# Patient Record
Sex: FEMALE | Race: BLACK OR AFRICAN AMERICAN | Employment: PART TIME | URBAN - METROPOLITAN AREA
[De-identification: names, ages, dates, MRNs, and addresses within clinical notes are randomized per-mention and may not be internally consistent; named-entity substitution may affect disease eponyms.]

---

## 2017-06-15 LAB
CHLAMYDIA, EXTERNAL: NEGATIVE
HBSAG, EXTERNAL: NEGATIVE
HIV, EXTERNAL: NON REACTIVE
N. GONORRHEA, EXTERNAL: NEGATIVE
RPR, EXTERNAL: NON REACTIVE
RUBELLA, EXTERNAL: NORMAL
TYPE, ABO & RH, EXTERNAL: NORMAL

## 2017-09-06 ENCOUNTER — HOSPITAL ENCOUNTER (OUTPATIENT)
Age: 27
Setting detail: OBSERVATION
Discharge: OTHER INSTITUTION | End: 2017-09-06
Attending: OBSTETRICS & GYNECOLOGY | Admitting: OBSTETRICS & GYNECOLOGY
Payer: MEDICAID

## 2017-09-06 VITALS
HEART RATE: 81 BPM | WEIGHT: 207 LBS | SYSTOLIC BLOOD PRESSURE: 120 MMHG | OXYGEN SATURATION: 100 % | HEIGHT: 66 IN | RESPIRATION RATE: 20 BRPM | DIASTOLIC BLOOD PRESSURE: 66 MMHG | BODY MASS INDEX: 33.27 KG/M2 | TEMPERATURE: 98.3 F

## 2017-09-06 PROBLEM — Z33.1 IUP (INTRAUTERINE PREGNANCY), INCIDENTAL: Status: ACTIVE | Noted: 2017-09-06

## 2017-09-06 LAB
ABO + RH BLD: NORMAL
AMPHET UR QL SCN: NEGATIVE
BARBITURATES UR QL SCN: NEGATIVE
BASOPHILS # BLD: 0 K/UL (ref 0–0.06)
BASOPHILS NFR BLD: 0 % (ref 0–2)
BENZODIAZ UR QL: NEGATIVE
BLOOD GROUP ANTIBODIES SERPL: NORMAL
CANNABINOIDS UR QL SCN: POSITIVE
COCAINE UR QL SCN: NEGATIVE
DIFFERENTIAL METHOD BLD: ABNORMAL
EOSINOPHIL # BLD: 0 K/UL (ref 0–0.4)
EOSINOPHIL NFR BLD: 0 % (ref 0–5)
ERYTHROCYTE [DISTWIDTH] IN BLOOD BY AUTOMATED COUNT: 13.9 % (ref 11.6–14.5)
HCT VFR BLD AUTO: 32.9 % (ref 35–45)
HDSCOM,HDSCOM: ABNORMAL
HGB BLD-MCNC: 11.4 G/DL (ref 12–16)
LYMPHOCYTES # BLD: 2.2 K/UL (ref 0.9–3.6)
LYMPHOCYTES NFR BLD: 25 % (ref 21–52)
MCH RBC QN AUTO: 29.7 PG (ref 24–34)
MCHC RBC AUTO-ENTMCNC: 34.7 G/DL (ref 31–37)
MCV RBC AUTO: 85.7 FL (ref 74–97)
METHADONE UR QL: NEGATIVE
MONOCYTES # BLD: 0.7 K/UL (ref 0.05–1.2)
MONOCYTES NFR BLD: 7 % (ref 3–10)
NEUTS SEG # BLD: 6 K/UL (ref 1.8–8)
NEUTS SEG NFR BLD: 68 % (ref 40–73)
OPIATES UR QL: NEGATIVE
PCP UR QL: NEGATIVE
PLATELET # BLD AUTO: 240 K/UL (ref 135–420)
PMV BLD AUTO: 10.9 FL (ref 9.2–11.8)
RBC # BLD AUTO: 3.84 M/UL (ref 4.2–5.3)
SPECIMEN EXP DATE BLD: NORMAL
WBC # BLD AUTO: 8.9 K/UL (ref 4.6–13.2)

## 2017-09-06 PROCEDURE — 86900 BLOOD TYPING SEROLOGIC ABO: CPT

## 2017-09-06 PROCEDURE — 99285 EMERGENCY DEPT VISIT HI MDM: CPT

## 2017-09-06 PROCEDURE — 96360 HYDRATION IV INFUSION INIT: CPT

## 2017-09-06 PROCEDURE — 96372 THER/PROPH/DIAG INJ SC/IM: CPT

## 2017-09-06 PROCEDURE — 99218 HC RM OBSERVATION: CPT

## 2017-09-06 PROCEDURE — 80307 DRUG TEST PRSMV CHEM ANLYZR: CPT

## 2017-09-06 PROCEDURE — 85025 COMPLETE CBC W/AUTO DIFF WBC: CPT

## 2017-09-06 PROCEDURE — 36415 COLL VENOUS BLD VENIPUNCTURE: CPT

## 2017-09-06 PROCEDURE — 96365 THER/PROPH/DIAG IV INF INIT: CPT

## 2017-09-06 PROCEDURE — 74011250636 HC RX REV CODE- 250/636: Performed by: ADVANCED PRACTICE MIDWIFE

## 2017-09-06 PROCEDURE — 87086 URINE CULTURE/COLONY COUNT: CPT

## 2017-09-06 RX ORDER — SODIUM CHLORIDE 0.9 % (FLUSH) 0.9 %
5-10 SYRINGE (ML) INJECTION EVERY 8 HOURS
Status: DISCONTINUED | OUTPATIENT
Start: 2017-09-06 | End: 2017-09-06 | Stop reason: HOSPADM

## 2017-09-06 RX ORDER — BETAMETHASONE SODIUM PHOSPHATE AND BETAMETHASONE ACETATE 3; 3 MG/ML; MG/ML
12 INJECTION, SUSPENSION INTRA-ARTICULAR; INTRALESIONAL; INTRAMUSCULAR; SOFT TISSUE ONCE
Status: COMPLETED | OUTPATIENT
Start: 2017-09-06 | End: 2017-09-06

## 2017-09-06 RX ORDER — MAGNESIUM SULFATE HEPTAHYDRATE 40 MG/ML
2 INJECTION, SOLUTION INTRAVENOUS
Status: COMPLETED | OUTPATIENT
Start: 2017-09-06 | End: 2017-09-06

## 2017-09-06 RX ORDER — SODIUM CHLORIDE, SODIUM LACTATE, POTASSIUM CHLORIDE, CALCIUM CHLORIDE 600; 310; 30; 20 MG/100ML; MG/100ML; MG/100ML; MG/100ML
75 INJECTION, SOLUTION INTRAVENOUS CONTINUOUS
Status: DISCONTINUED | OUTPATIENT
Start: 2017-09-06 | End: 2017-09-06 | Stop reason: HOSPADM

## 2017-09-06 RX ADMIN — BETAMETHASONE SODIUM PHOSPHATE AND BETAMETHASONE ACETATE 12 MG: 3; 3 INJECTION, SUSPENSION INTRA-ARTICULAR; INTRALESIONAL; INTRAMUSCULAR at 09:13

## 2017-09-06 RX ADMIN — Medication 2 G/HR: at 09:40

## 2017-09-06 RX ADMIN — MAGNESIUM SULFATE HEPTAHYDRATE 2 G: 40 INJECTION, SOLUTION INTRAVENOUS at 09:15

## 2017-09-06 RX ADMIN — MAGNESIUM SULFATE HEPTAHYDRATE 2 G: 40 INJECTION, SOLUTION INTRAVENOUS at 09:23

## 2017-09-06 RX ADMIN — SODIUM CHLORIDE, SODIUM LACTATE, POTASSIUM CHLORIDE, AND CALCIUM CHLORIDE 75 ML/HR: 600; 310; 30; 20 INJECTION, SOLUTION INTRAVENOUS at 09:20

## 2017-09-06 NOTE — IP AVS SNAPSHOT
Summary of Care Report The Summary of Care report has been created to help improve care coordination. Users with access to [employer] or [address] (Web-based application) may access additional patient information including the Discharge Summary. If you are not currently a [address] user and need more information, please call the number listed below in the [place] [address] section and ask to be connected with Medical Records. Facility Information Name Address Phone [address] [address] [phone] Patient Information Patient Name Sex  Sadiq Boyd ([id number]) Female 1990 Discharge Information Admitting Provider Service Area Unit Lor Harrison MD / [phone] [employer] [address] [place] [address] / [phone] Discharge Provider Discharge Date/Time Discharge Disposition Destination (none) (none) (none) (none) Patient Language Language ENGLISH [13] Hospital Problems as of 2017  Never Reviewed Class Noted - Resolved Last Modified POA Active Problems IUP (intrauterine pregnancy), incidental  2017 - Present 2017 by Lor Harrison MD Unknown Entered by Lor Harrison MD  
  
You are allergic to the following No active allergies Current Discharge Medication List  
  
Notice You have not been prescribed any medications. Follow-up Information None Discharge Instructions Learning About When to Call Your Doctor During Pregnancy (After 20 Weeks) Your Care Instructions It's common to have concerns about what might be a problem during pregnancy. Although most pregnant women don't have any serious problems, it's important to know when to call your doctor if you have certain symptoms or signs of labor. These are general suggestions. Your doctor may give you some more information about when to call. When to call your doctor (after 20 weeks) Call 911 anytime you think you may need emergency care. For example, call if: 
· You have severe vaginal bleeding. · You have sudden, severe pain in your belly. · You passed out (lost consciousness). · You have a seizure. · You see or feel the umbilical cord. · You think you are about to deliver your baby and can't make it safely to the hospital. 
Call your doctor now or seek immediate medical care if: 
· You have vaginal bleeding. · You have belly pain. · You have a fever. · You have symptoms of preeclampsia, such as: 
¨ Sudden swelling of your face, hands, or feet. ¨ New vision problems (such as dimness or blurring). ¨ A severe headache. · You have a sudden release of fluid from your vagina. (You think your water broke.) · You think that you may be in labor. This means that you've had at least 4 contractions within 20 minutes or at least 8 contractions in an hour. · You notice that your baby has stopped moving or is moving much less than normal. 
· You have symptoms of a urinary tract infection. These may include: 
¨ Pain or burning when you urinate. ¨ A frequent need to urinate without being able to pass much urine. ¨ Pain in the flank, which is just below the rib cage and above the waist on either side of the back. ¨ Blood in your urine. Watch closely for changes in your health, and be sure to contact your doctor if: 
· You have vaginal discharge that smells bad. · You have skin changes, such as: ¨ A rash. ¨ Itching. ¨ Yellow color to your skin. · You have other concerns about your pregnancy. If you have labor signs at 37 weeks or more If you have signs of labor at 37 weeks or more, your doctor may tell you to call when your labor becomes more active. Symptoms of active labor include: 
· Contractions that are regular. · Contractions that are less than 5 minutes apart. · Contractions that are hard to talk through. Follow-up care is a key part of your treatment and safety. Be sure to make and go to all appointments, and call your doctor if you are having problems. It's also a good idea to know your test results and keep a list of the medicines you take. Where can you learn more? Go to http://alyse-mayi.info/. Enter  in the search box to learn more about \"Learning About When to Call Your Doctor During Pregnancy (After 20 Weeks). \" 
Current as of: March 16, 2017 Content Version: 11.3 © 2259-6274 [email or web address]. Care instructions adapted under license by [employer] (which disclaims liability or warranty for this information). If you have questions about a medical condition or this instruction, always ask your healthcare professional. [place] [address] any warranty or liability for your use of this information. Chart Review Routing History No Routing History on File

## 2017-09-06 NOTE — PROGRESS NOTES
Pt transferred from [facility] via ambulance for bleeding. Pt states it was enough to saturate 2 towels. Nahum Elizabeth at bedside doing sterile speculum. Pt is 1cm via sterile spec. Plan of care is to give Celestone, and Magnesium drip and then transfer to [facility] Dr Vivi Hines at bedside. Plan of care discussed. 1591 SBAR report to [facility]. Transport to arrive shortly. [id number] Transport here to  patient.

## 2017-09-06 NOTE — DISCHARGE INSTRUCTIONS
Learning About When to Call Your Doctor During Pregnancy (After 20 Weeks)  Your Care Instructions  It's common to have concerns about what might be a problem during pregnancy. Although most pregnant women don't have any serious problems, it's important to know when to call your doctor if you have certain symptoms or signs of labor. These are general suggestions. Your doctor may give you some more information about when to call. When to call your doctor (after 20 weeks)  Call 911 anytime you think you may need emergency care. For example, call if:  · You have severe vaginal bleeding. · You have sudden, severe pain in your belly. · You passed out (lost consciousness). · You have a seizure. · You see or feel the umbilical cord. · You think you are about to deliver your baby and can't make it safely to the hospital.  Call your doctor now or seek immediate medical care if:  · You have vaginal bleeding. · You have belly pain. · You have a fever. · You have symptoms of preeclampsia, such as:  ¨ Sudden swelling of your face, hands, or feet. ¨ New vision problems (such as dimness or blurring). ¨ A severe headache. · You have a sudden release of fluid from your vagina. (You think your water broke.)  · You think that you may be in labor. This means that you've had at least 4 contractions within 20 minutes or at least 8 contractions in an hour. · You notice that your baby has stopped moving or is moving much less than normal.  · You have symptoms of a urinary tract infection. These may include:  ¨ Pain or burning when you urinate. ¨ A frequent need to urinate without being able to pass much urine. ¨ Pain in the flank, which is just below the rib cage and above the waist on either side of the back. ¨ Blood in your urine. Watch closely for changes in your health, and be sure to contact your doctor if:  · You have vaginal discharge that smells bad.   · You have skin changes, such as:  ¨ A rash.  ¨ Itching. ¨ Yellow color to your skin. · You have other concerns about your pregnancy. If you have labor signs at 37 weeks or more  If you have signs of labor at 37 weeks or more, your doctor may tell you to call when your labor becomes more active. Symptoms of active labor include:  · Contractions that are regular. · Contractions that are less than 5 minutes apart. · Contractions that are hard to talk through. Follow-up care is a key part of your treatment and safety. Be sure to make and go to all appointments, and call your doctor if you are having problems. It's also a good idea to know your test results and keep a list of the medicines you take. Where can you learn more? Go to http://alyse-mayi.info/. Enter  in the search box to learn more about \"Learning About When to Call Your Doctor During Pregnancy (After 20 Weeks). \"  Current as of: March 16, 2017  Content Version: 11.3  © 7989-7967 [employer]. Care instructions adapted under license by [employer] (which disclaims liability or warranty for this information). If you have questions about a medical condition or this instruction, always ask your healthcare professional. [place] [address] any warranty or liability for your use of this information.

## 2017-09-06 NOTE — IP AVS SNAPSHOT
Astrid Israel 
 
 
 [address] [facility] [address] 
[phone] Patient: Monica July MRN: [id number] :1990 You are allergic to the following No active allergies Recent Documentation Height Weight BMI OB Status Smoking Status 1.676 m 93.9 kg 33.41 kg/m2 Pregnant Current Some Day Smoker Unresulted Labs Order Current Status CULTURE, URINE In process DRUG SCREEN, URINE In process TYPE & SCREEN Preliminary result Emergency Contacts Name Discharge Info Relation Home Work Mobile Haylie Palma DISCHARGE CAREGIVER [3] Other Relative [6] [phone] About your hospitalization You were admitted on:  N/A You last received care in the:  [place] [address] [facility] You were discharged on:  2017 Unit phone number:  [phone] Why you were hospitalized Your primary diagnosis was:  Not on File Your diagnoses also included:  Iup (Intrauterine Pregnancy), Incidental  
  
  
 
  
  
Providers Seen During Your Hospitalizations Provider Role Specialty Primary office phone Marshall Daniel MD Attending Provider Obstetrics & Gynecology [phone] Your Primary Care Physician (PCP) Primary Care Physician Office Phone Office Fax NONE ** None ** ** None ** Follow-up Information None Current Discharge Medication List  
  
Notice You have not been prescribed any medications. Discharge Instructions Learning About When to Call Your Doctor During Pregnancy (After 20 Weeks) Your Care Instructions It's common to have concerns about what might be a problem during pregnancy. Although most pregnant women don't have any serious problems, it's important to know when to call your doctor if you have certain symptoms or signs of labor. These are general suggestions.  Your doctor may give you some more information about when to call. When to call your doctor (after 20 weeks) Call 911 anytime you think you may need emergency care. For example, call if: 
· You have severe vaginal bleeding. · You have sudden, severe pain in your belly. · You passed out (lost consciousness). · You have a seizure. · You see or feel the umbilical cord. · You think you are about to deliver your baby and can't make it safely to the hospital. 
Call your doctor now or seek immediate medical care if: 
· You have vaginal bleeding. · You have belly pain. · You have a fever. · You have symptoms of preeclampsia, such as: 
¨ Sudden swelling of your face, hands, or feet. ¨ New vision problems (such as dimness or blurring). ¨ A severe headache. · You have a sudden release of fluid from your vagina. (You think your water broke.) · You think that you may be in labor. This means that you've had at least 4 contractions within 20 minutes or at least 8 contractions in an hour. · You notice that your baby has stopped moving or is moving much less than normal. 
· You have symptoms of a urinary tract infection. These may include: 
¨ Pain or burning when you urinate. ¨ A frequent need to urinate without being able to pass much urine. ¨ Pain in the flank, which is just below the rib cage and above the waist on either side of the back. ¨ Blood in your urine. Watch closely for changes in your health, and be sure to contact your doctor if: 
· You have vaginal discharge that smells bad. · You have skin changes, such as: ¨ A rash. ¨ Itching. ¨ Yellow color to your skin. · You have other concerns about your pregnancy. If you have labor signs at 37 weeks or more If you have signs of labor at 37 weeks or more, your doctor may tell you to call when your labor becomes more active. Symptoms of active labor include: 
· Contractions that are regular. · Contractions that are less than 5 minutes apart. · Contractions that are hard to talk through. Follow-up care is a key part of your treatment and safety. Be sure to make and go to all appointments, and call your doctor if you are having problems. It's also a good idea to know your test results and keep a list of the medicines you take. Where can you learn more? Go to http://alyse-mayi.info/. Enter  in the search box to learn more about \"Learning About When to Call Your Doctor During Pregnancy (After 20 Weeks). \" 
Current as of: March 16, 2017 Content Version: 11.3 © 5848-2249 [employer]. Care instructions adapted under license by [employer] (which disclaims liability or warranty for this information). If you have questions about a medical condition or this instruction, always ask your healthcare professional. [address] any warranty or liability for your use of this information. Discharge Orders None Introducing [facility]! Chan Ramos introduces [employer] patient portal. Now you can access parts of your medical record, email your doctor's office, and request medication refills online. 1. In your internet browser, go to [email or web address] 2. Click on the First Time User? Click Here link in the Sign In box. You will see the New Member Sign Up page. 3. Enter your [employer] Access Code exactly as it appears below. You will not need to use this code after youve completed the sign-up process. If you do not sign up before the expiration date, you must request a new code. · [employer] Access Code: [email or web address] Expires: 12/5/2017 10:09 AM 
 
4. Enter the last four digits of your Social Security Number (xxxx) and Date of Birth (mm/dd/yyyy) as indicated and click Submit. You will be taken to the next sign-up page. 5. Create a [employer] ID. This will be your [employer] login ID and cannot be changed, so think of one that is secure and easy to remember. 6. Create a TowerJ[employer] password. You can change your password at any time. 7. Enter your Password Reset Question and Answer. This can be used at a later time if you forget your password. 8. Enter your e-mail address. You will receive e-mail notification when new information is available in [address] 9. Click Sign Up. You can now view and download portions of your medical record. 10. Click the Download Summary menu link to download a portable copy of your medical information. If you have questions, please visit the Frequently Asked Questions section of the [employer] website. Remember, [employer]azz is NOT to be used for urgent needs. For medical emergencies, dial 911. Now available from your iPhone and Android! General Information Please provide this summary of care documentation to your next provider. Patient Signature:  ____________________________________________________________ Date:  ____________________________________________________________  
  
S[place] Provider Signature:  ____________________________________________________________ Date:  ____________________________________________________________

## 2017-09-06 NOTE — IP AVS SNAPSHOT
[address] [place] [id number] 
[phone] Patient: Ann-Marie Perez MRN: [id number] :1990 Current Discharge Medication List  
  
Notice You have not been prescribed any medications.

## 2017-09-06 NOTE — H&P
History & Physical    Name: Jessenia Hidalgo MRN: [id number]  SSN: [id number]    YOB: 1990  Age: 33 y.o. Sex: female        Subjective:     Estimated Date of Delivery: 12/15/17  OB History      Para Term  AB Living    1         SAB TAB Ectopic Molar Multiple Live Births                   Ms. Kaitlyn Silvestre   is admitted with pregnancy at 25 weeks 5 days for vaginal bleeding. She was transferred from [facility] via ambulance after two episodes of bleeding at home. Upon arrival patient passed a softball size clot in the bathroom  Prenatal course was normal. Please see prenatal records for details. Patient has a fundal fibroid 3.4x3. 4x3.8. PMHx, SHx, Family Hx, ROS unchanged from prenatal H&P. Group B Strep was not tested. Objective:     Vitals:  Vitals:    17 0833 17 0839   BP: 126/70    Pulse: 87    Resp: 20    Temp: 98.2 °F (36.8 °C)    Weight:  93.9 kg (207 lb)   Height:  5' 6\" (1.676 m)        Physical Exam:     Blood pressure 126/70, pulse 87, temperature 98.2 °F (36.8 °C), resp. rate 20, height 5' 6\" (1.676 m), weight 93.9 kg (207 lb), last menstrual period 10/11/2016. Temp (24hrs), Av.2 °F (36.8 °C), Min:98.2 °F (36.8 °C), Max:98.2 °F (36.8 °C)     Sterile speculum exam: cervix 1 cm dilated; blood coming from the cervical os         Data Review:   No results found for this or any previous visit (from the past 24 hour(s)). Monitor:  Reactivity: reactive for gestational age Variability:present Baseline:within normal limits        Assessment/Plan:     Plan:  Admit for vaginal bleeding, Risks/Benefits explained and Consent Signed. Magnesium sulfate 4GM load with 2GM maintenance for neuroprotection and start  steriods, betamethasone 12mg x2 doses q 24 hours. Urine culture and urine drug screen.         Signed By:  Isatu Wasserman CNM     2017

## 2017-09-06 NOTE — PROGRESS NOTES
Labor Progress Note  Patient seen, fetal heart rate and bleeding evaluated. Physical Exam:  Bleeding stable, no pain, uterine tenderness or ctxs     Fetal Heart Rate: Reactive for EGA    Assessment:  Stable enough for transfer    PLAN:  Dylon'd w/ Dr. Maritza ADRIAN who accepts transfer. Has had one dose of steroids, Mag bolus.      Mitzi Casas MD  9/6/2017  9:42 AM

## 2017-09-08 LAB
BACTERIA SPEC CULT: NORMAL
SERVICE CMNT-IMP: NORMAL

## 2017-09-17 ENCOUNTER — HOSPITAL ENCOUNTER (OUTPATIENT)
Age: 27
Setting detail: OBSERVATION
Discharge: HOME | End: 2017-09-18
Attending: OBSTETRICS & GYNECOLOGY | Admitting: OBSTETRICS & GYNECOLOGY
Payer: MEDICAID

## 2017-09-17 LAB
ABO + RH BLD: NORMAL
BASOPHILS # BLD: 0 K/UL (ref 0–0.06)
BASOPHILS NFR BLD: 0 % (ref 0–2)
BLOOD GROUP ANTIBODIES SERPL: NORMAL
DIFFERENTIAL METHOD BLD: ABNORMAL
EOSINOPHIL # BLD: 0.1 K/UL (ref 0–0.4)
EOSINOPHIL NFR BLD: 1 % (ref 0–5)
ERYTHROCYTE [DISTWIDTH] IN BLOOD BY AUTOMATED COUNT: 13.6 % (ref 11.6–14.5)
HCT VFR BLD AUTO: 32.5 % (ref 35–45)
HGB BLD-MCNC: 11.1 G/DL (ref 12–16)
LYMPHOCYTES # BLD: 2.5 K/UL (ref 0.9–3.6)
LYMPHOCYTES NFR BLD: 27 % (ref 21–52)
MCH RBC QN AUTO: 29.1 PG (ref 24–34)
MCHC RBC AUTO-ENTMCNC: 34.2 G/DL (ref 31–37)
MCV RBC AUTO: 85.1 FL (ref 74–97)
MONOCYTES # BLD: 0.7 K/UL (ref 0.05–1.2)
MONOCYTES NFR BLD: 8 % (ref 3–10)
NEUTS SEG # BLD: 5.9 K/UL (ref 1.8–8)
NEUTS SEG NFR BLD: 64 % (ref 40–73)
PLATELET # BLD AUTO: 286 K/UL (ref 135–420)
PMV BLD AUTO: 10.3 FL (ref 9.2–11.8)
RBC # BLD AUTO: 3.82 M/UL (ref 4.2–5.3)
SPECIMEN EXP DATE BLD: NORMAL
WBC # BLD AUTO: 9.1 K/UL (ref 4.6–13.2)

## 2017-09-17 PROCEDURE — 96361 HYDRATE IV INFUSION ADD-ON: CPT

## 2017-09-17 PROCEDURE — 36415 COLL VENOUS BLD VENIPUNCTURE: CPT | Performed by: OBSTETRICS & GYNECOLOGY

## 2017-09-17 PROCEDURE — 99218 HC RM OBSERVATION: CPT

## 2017-09-17 PROCEDURE — 74011250637 HC RX REV CODE- 250/637: Performed by: OBSTETRICS & GYNECOLOGY

## 2017-09-17 PROCEDURE — 86900 BLOOD TYPING SEROLOGIC ABO: CPT | Performed by: OBSTETRICS & GYNECOLOGY

## 2017-09-17 PROCEDURE — 96360 HYDRATION IV INFUSION INIT: CPT

## 2017-09-17 PROCEDURE — 85025 COMPLETE CBC W/AUTO DIFF WBC: CPT | Performed by: OBSTETRICS & GYNECOLOGY

## 2017-09-17 PROCEDURE — 74011250636 HC RX REV CODE- 250/636: Performed by: OBSTETRICS & GYNECOLOGY

## 2017-09-17 RX ORDER — LANOLIN ALCOHOL/MO/W.PET/CERES
CREAM (GRAM) TOPICAL
Status: ON HOLD | COMMUNITY
End: 2017-11-02

## 2017-09-17 RX ORDER — ACETAMINOPHEN 325 MG/1
650 TABLET ORAL
Status: DISCONTINUED | OUTPATIENT
Start: 2017-09-17 | End: 2017-09-18 | Stop reason: HOSPADM

## 2017-09-17 RX ORDER — SODIUM CHLORIDE, SODIUM LACTATE, POTASSIUM CHLORIDE, CALCIUM CHLORIDE 600; 310; 30; 20 MG/100ML; MG/100ML; MG/100ML; MG/100ML
125 INJECTION, SOLUTION INTRAVENOUS CONTINUOUS
Status: DISCONTINUED | OUTPATIENT
Start: 2017-09-17 | End: 2017-09-18 | Stop reason: HOSPADM

## 2017-09-17 RX ORDER — DOCUSATE SODIUM 100 MG/1
100 CAPSULE, LIQUID FILLED ORAL 2 TIMES DAILY
Status: ON HOLD | COMMUNITY
End: 2017-11-02

## 2017-09-17 RX ORDER — ZOLPIDEM TARTRATE 5 MG/1
5 TABLET ORAL
Status: DISCONTINUED | OUTPATIENT
Start: 2017-09-17 | End: 2017-09-18 | Stop reason: HOSPADM

## 2017-09-17 RX ADMIN — SODIUM CHLORIDE, SODIUM LACTATE, POTASSIUM CHLORIDE, AND CALCIUM CHLORIDE 125 ML/HR: 600; 310; 30; 20 INJECTION, SOLUTION INTRAVENOUS at 22:04

## 2017-09-17 RX ADMIN — ZOLPIDEM TARTRATE 5 MG: 5 TABLET ORAL at 23:35

## 2017-09-17 RX ADMIN — ACETAMINOPHEN 650 MG: 325 TABLET ORAL at 23:35

## 2017-09-17 NOTE — PROGRESS NOTES
1935 Received to L & D unit c/o vaginal bleeding. States she passed some small clots at home. 27 weeks G1. States was seen at [facility] last week and was transferred to [facility] X 5 days. Assisted to Triage #3.     1943 EFM tested and applied. Abdomen palpates soft. 2000 Scant bleeding noted on pad. 2110  States feeling vaginal bleeding. Small amount of bright red blood noted on pad. 2118 Dr. Stephanie cali. 2130 Dr. Leobardo cali with quick return call and informed of arrival with c/o passing small blood clots at home, 27 weeks, G1,  history of vaginal bleeding which began on 9/6/17, was at [facility] X5 days, received magnesium and 2 doses steroids, then was [address] home. Occasional irritability, EFM tracing described. Orders received to keep as observation, IV- LR and labs. 2136  Plan of care reviewed. 2137 Transferred to [facility] #3.     2140 Lab at bedside. 2206 Audible fetal movement. Told to call nurse if she feels any vaginal bleeding. 0200 Resting in bed with eyes closed. 0340 Assisted up to BR scant dark brown blood noted on pad. Scant red blood noted when wiped. States cramping is gone. C/o hip pain 6/10.    0343 EFM adjusted. 0715 Bedside and Verbal shift change report given to LEO Salazar RN (oncoming nurse) by LYNSEY AUGUSTE (offgoing nurse). Report included the following information SBAR, Procedure Summary, Intake/Output and MAR.

## 2017-09-17 NOTE — IP AVS SNAPSHOT
Summary of Care Report The Summary of Care report has been created to help improve care coordination. Users with access to [employer] or [address] (Web-based application) may access additional patient information including the Discharge Summary. If you are not currently a [address] user and need more information, please call the number listed below in the [place] [address] section and ask to be connected with Medical Records. Facility Information Name Address Phone [address] [address] [phone] Patient Information Patient Name Sex  Gonzalo Kruse ([id number]) Female 1990 Discharge Information Admitting Provider Service Area Unit Peter Celis MD / [phone] [address] L&D / [phone] Discharge Provider Discharge Date/Time Discharge Disposition Destination (none) 2017 Midday (Pending) AHR (none) Patient Language Language ENGLISH [13] Hospital Problems as of 2017  Never Reviewed Class Noted - Resolved Last Modified POA Active Problems Vaginal bleeding in pregnancy  2017 - Present 2017 by Peter Celis MD Unknown Entered by Peter Celis MD  
  
Non-Hospital Problems as of 2017  Never Reviewed Class Noted - Resolved Last Modified Active Problems IUP (intrauterine pregnancy), incidental  2017 - Present 2017 by Peter Celis MD  
  Entered by Peter Celis MD  
  
You are allergic to the following No active allergies Current Discharge Medication List  
  
ASK your doctor about these medications Dose & Instructions Dispensing Information Comments COLACE 100 mg capsule Generic drug:  docusate sodium Dose:  100 mg Take 100 mg by mouth two (2) times a day. Refills:  0 ferrous sulfate 325 mg (65 mg iron) tablet Take  by mouth Daily (before breakfast). Refills:  0  
   
 PNV No12-Iron-FA-DSS-OM-3 29 mg iron-1 mg -50 mg Cpkd Take  by mouth. Refills:  0 Follow-up Information Follow up With Details Comments Contact Info None   None (395) Patient stated that they have no PCP Discharge Instructions DISCHARGE SUMMARY from Nurse ThePATIENT INSTRUCTIONS: 
 
After general anesthesia or intravenous sedation, for 24 hours or while taking prescription Narcotics: · Limit your activities · Do not drive and operate hazardous machinery · Do not make important personal or business decisions · Do  not drink alcoholic beverages · If you have not urinated within 8 hours after discharge, please contact your surgeon on call. Report the following to your surgeon: 
· Excessive pain, swelling, redness or odor of or around the surgical area · Temperature over 100.5 · Nausea and vomiting lasting longer than 4 hours or if unable to take medications · Any signs of decreased circulation or nerve impairment to extremity: change in color, persistent  numbness, tingling, coldness or increase pain · Any questions If you experience any of the following symptoms vaginal bleeding, please call your OBGYN, Dr. Valery Garza. These are general instructions for a healthy lifestyle: No smoking/ No tobacco products/ Avoid exposure to second hand smoke Surgeon General's Warning:  Quitting smoking now greatly reduces serious risk to your health. Obesity, smoking, and sedentary lifestyle greatly increases your risk for illness A healthy diet, regular physical exercise & weight monitoring are important for maintaining a healthy lifestyle You may be retaining fluid if you have a history of heart failure or if you experience any of the following symptoms:  Weight gain of 3 pounds or more overnight or 5 pounds in a week, increased swelling in our hands or feet or shortness of breath while lying flat in bed. Please call your doctor as soon as you notice any of these symptoms; do not wait until your next office visit. Recognize signs and symptoms of STROKE: 
 
F-face looks uneven A-arms unable to move or move unevenly S-speech slurred or non-existent T-time-call 911 as soon as signs and symptoms begin-DO NOT go Back to bed or wait to see if you get better-TIME IS BRAIN. Warning Signs of HEART ATTACK Call 911 if you have these symptoms: 
? Chest discomfort. Most heart attacks involve discomfort in the center of the chest that lasts more than a few minutes, or that goes away and comes back. It can feel like uncomfortable pressure, squeezing, fullness, or pain. ? Discomfort in other areas of the upper body. Symptoms can include pain or discomfort in one or both arms, the back, neck, jaw, or stomach. ? Shortness of breath with or without chest discomfort. ? Other signs may include breaking out in a cold sweat, nausea, or lightheadedness. Don't wait more than five minutes to call [address]! Fast action can save your life. Calling 911 is almost always the fastest way to get lifesaving treatment. Emergency Medical Services staff can begin treatment when they arrive  up to an hour sooner than if someone gets to the hospital by car. The discharge information has been reviewed with the patient. The patient verbalized understanding. Discharge medications reviewed with the patient and appropriate educational materials and side effects teaching were provided. Educated patient regarding fetal kick counts, signs and symptoms of active labor, symptoms to report to MD, when to come back to hospital, and instructed on when to report symptoms to MD.   
 
 
Chart Review Routing History No Routing History on File

## 2017-09-17 NOTE — IP AVS SNAPSHOT
[place] [address] [address] 
[phone] Patient: Molly Nunez MRN: [id number] :1990 Current Discharge Medication List  
  
ASK your doctor about these medications Dose & Instructions Dispensing Information Comments Morning Noon Evening Bedtime COLACE 100 mg capsule Generic drug:  docusate sodium Your last dose was: Your next dose is:    
   
   
 Dose:  100 mg Take 100 mg by mouth two (2) times a day. Refills:  0  
     
   
   
   
  
 ferrous sulfate 325 mg (65 mg iron) tablet Your last dose was: Your next dose is: Take  by mouth Daily (before breakfast). Refills:  0  
     
   
   
   
  
 PNV No12-Iron-FA-DSS-OM-3 29 mg iron-1 mg -50 mg Cpkd Your last dose was: Your next dose is: Take  by mouth. Refills:  0

## 2017-09-17 NOTE — IP AVS SNAPSHOT
[address] [address] 
[phone] Patient: Ann-Marie Perez MRN: [id number] :1990 You are allergic to the following No active allergies Recent Documentation Height Weight BMI OB Status Smoking Status 1.676 m 91.6 kg 32.6 kg/m2 Pregnant Current Some Day Smoker Emergency Contacts Name Discharge Info Relation Home Work Mobile Haylie Palma DISCHARGE CAREGIVER [3] Other Relative [6] [phone] About your hospitalization You were admitted on:  2017 You last received care in the:  [address] You were discharged on:  2017 Unit phone number:  [phone] Why you were hospitalized Your primary diagnosis was:  Not on File Your diagnoses also included:  Vaginal Bleeding In Pregnancy Providers Seen During Your Hospitalizations Provider Role Specialty Primary office phone Verenice Del Rio MD Attending Provider Obstetrics & Gynecology [phone] Your Primary Care Physician (PCP) Primary Care Physician Office Phone Office Fax NONE ** None ** ** None ** Follow-up Information Follow up With Details Comments Contact Info None   None (395) Patient stated that they have no PCP Current Discharge Medication List  
  
ASK your doctor about these medications Dose & Instructions Dispensing Information Comments Morning Noon Evening Bedtime COLACE 100 mg capsule Generic drug:  docusate sodium Your last dose was: Your next dose is:    
   
   
 Dose:  100 mg Take 100 mg by mouth two (2) times a day. Refills:  0  
     
   
   
   
  
 ferrous sulfate 325 mg (65 mg iron) tablet Your last dose was: Your next dose is: Take  by mouth Daily (before breakfast). Refills:  0 PNV No12-Iron-FA-DSS-OM-3 29 mg iron-1 mg -50 mg Cpkd Your last dose was: Your next dose is: Take  by mouth. Refills:  0 Discharge Instructions DISCHARGE SUMMARY from Nurse ThePATIENT INSTRUCTIONS: 
 
After general anesthesia or intravenous sedation, for 24 hours or while taking prescription Narcotics: · Limit your activities · Do not drive and operate hazardous machinery · Do not make important personal or business decisions · Do  not drink alcoholic beverages · If you have not urinated within 8 hours after discharge, please contact your surgeon on call. Report the following to your surgeon: 
· Excessive pain, swelling, redness or odor of or around the surgical area · Temperature over 100.5 · Nausea and vomiting lasting longer than 4 hours or if unable to take medications · Any signs of decreased circulation or nerve impairment to extremity: change in color, persistent  numbness, tingling, coldness or increase pain · Any questions If you experience any of the following symptoms vaginal bleeding, please call your OBGYN, Dr. Aidan Yap. These are general instructions for a healthy lifestyle: No smoking/ No tobacco products/ Avoid exposure to second hand smoke Surgeon General's Warning:  Quitting smoking now greatly reduces serious risk to your health. Obesity, smoking, and sedentary lifestyle greatly increases your risk for illness A healthy diet, regular physical exercise & weight monitoring are important for maintaining a healthy lifestyle You may be retaining fluid if you have a history of heart failure or if you experience any of the following symptoms:  Weight gain of 3 pounds or more overnight or 5 pounds in a week, increased swelling in our hands or feet or shortness of breath while lying flat in bed.   Please call your doctor as soon as you notice any of these symptoms; do not wait until your next office visit. Recognize signs and symptoms of STROKE: 
 
F-face looks uneven A-arms unable to move or move unevenly S-speech slurred or non-existent T-time-call 911 as soon as signs and symptoms begin-DO NOT go Back to bed or wait to see if you get better-TIME IS BRAIN. Warning Signs of HEART ATTACK Call 911 if you have these symptoms: 
? Chest discomfort. Most heart attacks involve discomfort in the center of the chest that lasts more than a few minutes, or that goes away and comes back. It can feel like uncomfortable pressure, squeezing, fullness, or pain. ? Discomfort in other areas of the upper body. Symptoms can include pain or discomfort in one or both arms, the back, neck, jaw, or stomach. ? Shortness of breath with or without chest discomfort. ? Other signs may include breaking out in a cold sweat, nausea, or lightheadedness. Don't wait more than five minutes to call [address]! Fast action can save your life. Calling 911 is almost always the fastest way to get lifesaving treatment. Emergency Medical Services staff can begin treatment when they arrive  up to an hour sooner than if someone gets to the hospital by car. The discharge information has been reviewed with the patient. The patient verbalized understanding. Discharge medications reviewed with the patient and appropriate educational materials and side effects teaching were provided. Educated patient regarding fetal kick counts, signs and symptoms of active labor, symptoms to report to MD, when to come back to hospital, and instructed on when to report symptoms to MD. Discharge Instructions Attachments/References PREGNANCY: WEEKS 22 TO 26 (ENGLISH) PREGNANCY: VAGINAL BLEEDING (ENGLISH) Discharge Orders None Introducing [facility] SERVICES!    
 Rafa Beaulieu introduces [employer] patient portal. Now you can access parts of your medical record, email your doctor's office, and request medication refills online. 1. In your internet browser, go to [email or web address] 2. Click on the First Time User? Click Here link in the Sign In box. You will see the New Member Sign Up page. 3. Enter your Zbird Access Code exactly as it appears below. You will not need to use this code after youve completed the sign-up process. If you do not sign up before the expiration date, you must request a new code. · Zbird Access Code: [email or web address] Expires: 12/5/2017 10:09 AM 
 
4. Enter the last four digits of your Social Security Number (xxxx) and Date of Birth (mm/dd/yyyy) as indicated and click Submit. You will be taken to the next sign-up page. 5. Create a Zbird ID. This will be your Zbird login ID and cannot be changed, so think of one that is secure and easy to remember. 6. Create a Zbird password. You can change your password at any time. 7. Enter your Password Reset Question and Answer. This can be used at a later time if you forget your password. 8. Enter your e-mail address. You will receive e-mail notification when new information is available in [address] 9. Click Sign Up. You can now view and download portions of your medical record. 10. Click the Download Summary menu link to download a portable copy of your medical information. If you have questions, please visit the Frequently Asked Questions section of the Zbird website. Remember, Zbird is NOT to be used for urgent needs. For medical emergencies, dial 911. Now available from your iPhone and Android! General Information Please provide this summary of care documentation to your next provider. Patient Signature:  ____________________________________________________________ Date:  ____________________________________________________________  
  
Fayrene Retort  Provider Signature: ____________________________________________________________ Date:  ____________________________________________________________ More Information Weeks 22 to 26 of Your Pregnancy: Care Instructions Your Care Instructions As you enter your 7th month of pregnancy at week 26, your baby's lungs are growing stronger and getting ready to breathe. You may notice that your baby responds to the sound of your or your partner's voice. You may also notice that your baby does less turning and twisting and more squirming or jerking. Jerking often means that your baby has the hiccups. Hiccups are perfectly normal and are only temporary. You may want to think about attending a childbirth preparation class. This is also a good time to start thinking about whether you want to have pain medicine during labor. Most pregnant women are tested for gestational diabetes between weeks 25 and 28. Gestational diabetes occurs when your blood sugar level gets too high when you're pregnant. The test is important, because you can have gestational diabetes and not know it. But the condition can cause problems for your baby. Follow-up care is a key part of your treatment and safety. Be sure to make and go to all appointments, and call your doctor if you are having problems. It's also a good idea to know your test results and keep a list of the medicines you take. How can you care for yourself at home? Ease discomfort from your baby's kicking · Change your position. Sometimes this will cause your baby to change position too. · Take a deep breath while you raise your arm over your head. Then breathe out while you drop your arm. Do Kegel exercises to prevent urine from leaking · You can do Kegel exercises while you stand or sit. ¨ Squeeze the same muscles you would use to stop your urine. Your belly and thighs should not move. ¨ Hold the squeeze for 3 seconds, and then relax for 3 seconds. ¨ Start with 3 seconds. Then add 1 second each week until you are able to squeeze for 10 seconds. ¨ Repeat the exercise 10 to 15 times for each session. Do three or more sessions each day. Ease or reduce swelling in your feet, ankles, hands, and fingers · If your fingers are puffy, take off your rings. · Do not eat high-salt foods, such as potato chips. · Prop up your feet on a stool or couch as much as possible. Sleep with pillows under your feet. · Do not stand for long periods of time or wear tight shoes. · Wear support stockings. Where can you learn more? Go to http://alyse-mayi.info/. Enter G264 in the search box to learn more about \"Weeks 22 to 26 of Your Pregnancy: Care Instructions. \" Current as of: March 16, 2017 Content Version: 11.3 © 0676-0442 [employer]. Care instructions adapted under license by [employer] (which disclaims liability or warranty for this information). If you have questions about a medical condition or this instruction, always ask your healthcare professional. [place] [address] any warranty or liability for your use of this information. Vaginal Bleeding During Pregnancy: Care Instructions Your Care Instructions Many women have some vaginal bleeding when they are pregnant. In some cases, the bleeding is not serious. And there aren't any more problems with the pregnancy. But sometimes bleeding is a sign of a more serious problem. This is more common if the bleeding is heavy or painful. Examples of more serious problems include miscarriage, an ectopic pregnancy, or a problem with the placenta. You may have to see your doctor again to be sure everything is okay. You may also need more tests to find the cause of the bleeding. For some women, home treatment is all they need. But it depends on what is causing the bleeding.  Be sure to tell your doctor if you have any new symptoms or if your symptoms get worse. The doctor has checked you carefully, but problems can develop later. If you notice any problems or new symptoms, get medical treatment right away. Follow-up care is a key part of your treatment and safety. Be sure to make and go to all appointments, and call your doctor if you are having problems. It's also a good idea to know your test results and keep a list of the medicines you take. How can you care for yourself at home? · If your doctor prescribed medicines, take them exactly as directed. Call your doctor if you think you are having a problem with your medicine. · Do not have sex until the bleeding stops and your doctor says it's okay. · Ask your doctor about other activities you can or can't do. · Get a lot of rest. Being pregnant can make you tired. · Eat a healthy diet. Include a lot of peas, beans, and leafy green vegetables. Talk to a dietitian if you need help planning your diet. · Do not use nonsteroidal anti-inflammatory drugs (NSAIDs), such as ibuprofen (Advil, Motrin), naproxen (Aleve), or aspirin, unless your doctor says it is okay. When should you call for help? Call 911 anytime you think you may need emergency care. For example, call if: 
· You have sudden, severe pain in your belly. · You passed out (lost consciousness). · You have severe vaginal bleeding. Call your doctor now or seek immediate medical care if: 
· You are dizzy or lightheaded or you feel like you may faint. · You have new or worse pain in your belly or pelvis. · Your vaginal bleeding is getting worse. · You have increased pain in the vaginal area. · You have a fever. Watch closely for changes in your health, and be sure to contact your doctor if: 
· You have new or worse vaginal discharge. · You do not get better as expected. Where can you learn more? Go to http://alyse-mayi.info/. Enter T664 in the search box to learn more about \"Vaginal Bleeding During Pregnancy: Care Instructions. \" Current as of: March 16, 2017 Content Version: 11.3 © 0334-3598 [employer]. Care instructions adapted under license by [employer] (which disclaims liability or warranty for this information). If you have questions about a medical condition or this instruction, always ask your healthcare professional. [place] [address] any warranty or liability for your use of this information.

## 2017-09-18 VITALS
HEART RATE: 80 BPM | WEIGHT: 202 LBS | HEIGHT: 66 IN | BODY MASS INDEX: 32.47 KG/M2 | DIASTOLIC BLOOD PRESSURE: 64 MMHG | SYSTOLIC BLOOD PRESSURE: 104 MMHG | TEMPERATURE: 99 F | RESPIRATION RATE: 18 BRPM

## 2017-09-18 PROBLEM — O46.90 VAGINAL BLEEDING IN PREGNANCY: Status: ACTIVE | Noted: 2017-09-18

## 2017-09-18 PROCEDURE — 96361 HYDRATE IV INFUSION ADD-ON: CPT

## 2017-09-18 PROCEDURE — 74011250637 HC RX REV CODE- 250/637: Performed by: OBSTETRICS & GYNECOLOGY

## 2017-09-18 PROCEDURE — 99218 HC RM OBSERVATION: CPT

## 2017-09-18 PROCEDURE — 74011250637 HC RX REV CODE- 250/637: Performed by: ADVANCED PRACTICE MIDWIFE

## 2017-09-18 PROCEDURE — 74011250636 HC RX REV CODE- 250/636: Performed by: OBSTETRICS & GYNECOLOGY

## 2017-09-18 RX ORDER — DOCUSATE SODIUM 100 MG/1
100 CAPSULE, LIQUID FILLED ORAL 2 TIMES DAILY
Status: DISCONTINUED | OUTPATIENT
Start: 2017-09-18 | End: 2017-09-18 | Stop reason: HOSPADM

## 2017-09-18 RX ADMIN — DOCUSATE SODIUM 100 MG: 100 CAPSULE, LIQUID FILLED ORAL at 11:22

## 2017-09-18 RX ADMIN — ACETAMINOPHEN 650 MG: 325 TABLET ORAL at 07:45

## 2017-09-18 RX ADMIN — SODIUM CHLORIDE, SODIUM LACTATE, POTASSIUM CHLORIDE, AND CALCIUM CHLORIDE 125 ML/HR: 600; 310; 30; 20 INJECTION, SOLUTION INTRAVENOUS at 12:19

## 2017-09-18 NOTE — PROGRESS NOTES
12 SBAR received from Jose Douglas RN.  8550 Patient resting, watching TV in bed. Scant brown, dime-sized blood on bob pad noted overnight. No active vaginal bleeding. Head to toe assessment complete. Elana Johansen CNM expected on unit at 0800.  4703 Paged LEO Patrick. Prompt return call. Will continue to monitor. 0900 Patient resting comfortably on left lateral side. Asked patient if she would like to see spa team.  Patient said she would. 1010 Patient resting with eyes closed, lights dim; left side. 1145 Patient resting comfortably. Encouraged to call cafeteria for lunch. Brought popsicle per request.  No active vaginal bleeding. 1300 Patient OOB ambulating in room; up to void; lunch tray delivered. 1630 Patient discharged per order. Discharge instructions explained and hard copies provided. Patient verbalized understanding. Educated patient regarding fetal kick counts, signs and symptoms of active labor, symptoms to report to MD, when to come back to hospital, and instructed on when to report symptoms to MD.  Patient verbalized she will call her OB if she experiences any more vaginal bleeding, and will follow-up with Dr. Angus Cam office for next scheduled appointment.

## 2017-09-18 NOTE — DISCHARGE INSTRUCTIONS
DISCHARGE SUMMARY from Nurse    ThePATIENT INSTRUCTIONS:    After general anesthesia or intravenous sedation, for 24 hours or while taking prescription Narcotics:  · Limit your activities  · Do not drive and operate hazardous machinery  · Do not make important personal or business decisions  · Do  not drink alcoholic beverages  · If you have not urinated within 8 hours after discharge, please contact your surgeon on call. Report the following to your surgeon:  · Excessive pain, swelling, redness or odor of or around the surgical area  · Temperature over 100.5  · Nausea and vomiting lasting longer than 4 hours or if unable to take medications  · Any signs of decreased circulation or nerve impairment to extremity: change in color, persistent  numbness, tingling, coldness or increase pain  · Any questions    If you experience any of the following symptoms vaginal bleeding, please call your OBGYN, Dr. Jay Mckeon. These are general instructions for a healthy lifestyle:    No smoking/ No tobacco products/ Avoid exposure to second hand smoke    Surgeon General's Warning:  Quitting smoking now greatly reduces serious risk to your health. Obesity, smoking, and sedentary lifestyle greatly increases your risk for illness    A healthy diet, regular physical exercise & weight monitoring are important for maintaining a healthy lifestyle    You may be retaining fluid if you have a history of heart failure or if you experience any of the following symptoms:  Weight gain of 3 pounds or more overnight or 5 pounds in a week, increased swelling in our hands or feet or shortness of breath while lying flat in bed. Please call your doctor as soon as you notice any of these symptoms; do not wait until your next office visit.     Recognize signs and symptoms of STROKE:    F-face looks uneven    A-arms unable to move or move unevenly    S-speech slurred or non-existent    T-time-call 911 as soon as signs and symptoms begin-DO NOT go Back to bed or wait to see if you get better-TIME IS BRAIN. Warning Signs of HEART ATTACK     Call 911 if you have these symptoms:   Chest discomfort. Most heart attacks involve discomfort in the center of the chest that lasts more than a few minutes, or that goes away and comes back. It can feel like uncomfortable pressure, squeezing, fullness, or pain.  Discomfort in other areas of the upper body. Symptoms can include pain or discomfort in one or both arms, the back, neck, jaw, or stomach.  Shortness of breath with or without chest discomfort.  Other signs may include breaking out in a cold sweat, nausea, or lightheadedness. Don't wait more than five minutes to call 911 - MINUTES MATTER! Fast action can save your life. Calling 911 is almost always the fastest way to get lifesaving treatment. Emergency Medical Services staff can begin treatment when they arrive -- up to an hour sooner than if someone gets to the hospital by car. The discharge information has been reviewed with the patient. The patient verbalized understanding. Discharge medications reviewed with the patient and appropriate educational materials and side effects teaching were provided.     Educated patient regarding fetal kick counts, signs and symptoms of active labor, symptoms to report to MD, when to come back to hospital, and instructed on when to report symptoms to MD.

## 2017-09-18 NOTE — H&P
History & Physical    Name: Alberto Lin MRN: [id number]  SSN: [id number]    YOB: 1990  Age: 33 y.o. Sex: female        Subjective:     Estimated Date of Delivery: 12/15/17  OB History      Para Term  AB Living    1         SAB TAB Ectopic Molar Multiple Live Births                   Ms. Aris Gill   is admitted for 23 hour observation with pregnancy at 27 weeks 3 days for bleeding. Patient passed a couple dime size clots. .   Prenatal course was complicated by a previous transfer to [facility] for bleeding; patient has a bleed behind the placenta but is stable. She had recieved a course of steroids and magnesium. Please see prenatal records for details. PMHx, SHx, Family Hx, ROS unchanged from prenatal H&P. Group B Strep was not tested. Objective:     Vitals:  Vitals:    17 2334 17 0346 17 0516 17 0740   BP: (!) 111/94 123/74  116/70   Pulse: 75 77  (!) 105   Resp: 18 20  18   Temp: 98.2 °F (36.8 °C) 97.6 °F (36.4 °C)  98.5 °F (36.9 °C)   Weight:   91.6 kg (202 lb)    Height:   5' 6\" (1.676 m)         Physical Exam:  Cervical Exam  Membrane Status: Intact  Blood pressure 116/70, pulse (!) 105, temperature 98.5 °F (36.9 °C), resp. rate 18, height 5' 6\" (1.676 m), weight 91.6 kg (202 lb), last menstrual period 10/11/2016.  Temp (24hrs), Av.2 °F (36.8 °C), Min:97.6 °F (36.4 °C), Max:98.6 °F (37 °C)              Data Review:   Recent Results (from the past 24 hour(s))   CBC WITH AUTOMATED DIFF    Collection Time: 17  9:40 PM   Result Value Ref Range    WBC 9.1 4.6 - 13.2 K/uL    RBC 3.82 (L) 4.20 - 5.30 M/uL    HGB 11.1 (L) 12.0 - 16.0 g/dL    HCT 32.5 (L) 35.0 - 45.0 %    MCV 85.1 74.0 - 97.0 FL    MCH 29.1 24.0 - 34.0 PG    MCHC 34.2 31.0 - 37.0 g/dL    RDW 13.6 11.6 - 14.5 %    PLATELET 214 353 - 977 K/uL    MPV 10.3 9.2 - 11.8 FL    NEUTROPHILS 64 40 - 73 %    LYMPHOCYTES 27 21 - 52 %    MONOCYTES 8 3 - 10 %    EOSINOPHILS 1 0 - 5 % BASOPHILS 0 0 - 2 %    ABS. NEUTROPHILS 5.9 1.8 - 8.0 K/UL    ABS. LYMPHOCYTES 2.5 0.9 - 3.6 K/UL    ABS. MONOCYTES 0.7 0.05 - 1.2 K/UL    ABS. EOSINOPHILS 0.1 0.0 - 0.4 K/UL    ABS. BASOPHILS 0.0 0.0 - 0.06 K/UL    DF AUTOMATED     TYPE & SCREEN    Collection Time: 09/17/17  9:40 PM   Result Value Ref Range    Crossmatch Expiration 09/20/2017     ABO/Rh(D) AB POSITIVE     Antibody screen NEG      Monitor:  Reactivity:present Variability:present Baseline:within normal limits        Assessment/Plan:     Plan:  Admit for 23 hour observation, Risks/Benefits explained and Consent Signed.           Signed By:  Jazmín Barton CNM     September 18, 2017

## 2017-11-02 ENCOUNTER — HOSPITAL ENCOUNTER (EMERGENCY)
Age: 27
Discharge: HOME | End: 2017-11-02
Attending: OBSTETRICS & GYNECOLOGY | Admitting: OBSTETRICS & GYNECOLOGY
Payer: MEDICAID

## 2017-11-02 VITALS
TEMPERATURE: 98.6 F | HEIGHT: 66 IN | SYSTOLIC BLOOD PRESSURE: 117 MMHG | DIASTOLIC BLOOD PRESSURE: 67 MMHG | HEART RATE: 98 BPM | BODY MASS INDEX: 34.87 KG/M2 | WEIGHT: 217 LBS | RESPIRATION RATE: 16 BRPM

## 2017-11-02 PROCEDURE — 59025 FETAL NON-STRESS TEST: CPT

## 2017-11-02 PROCEDURE — 99282 EMERGENCY DEPT VISIT SF MDM: CPT

## 2017-11-02 NOTE — IP AVS SNAPSHOT
[address] [place] [address] 
[phone] Patient: Rachel Pérez MRN: [id number] :1990 About your hospitalization You were admitted on:  N/A You last received care in the:  [address] [place] [address] L&D TRIAGE You were discharged on:  2017 Why you were hospitalized Your primary diagnosis was:  Not on File Things You Need To Do (next 8 weeks) Follow up with None Where:  None (395) Patient stated that they have no PCP Discharge Orders None A check drew indicates which time of day the medication should be taken. My Medications Notice You have not been prescribed any medications. Discharge Instructions Weeks 32 to 34 of Your Pregnancy: Care Instructions Your Care Instructions During the last few weeks of your pregnancy, you may have more aches and pains. It's important to rest when you can. Your growing baby is putting more pressure on your bladder. So you may need to urinate more often. Hemorrhoids are also common. These are painful, itchy veins in the rectal area. In the 36th week, most women have a test for group B streptococcus (GBS). GBS is a common bacteria that can live in the vagina and rectum. It can make your baby sick after birth. If you test positive, you will get antibiotics during labor. These will keep your baby from getting the bacteria. You may want to talk with your doctor about banking your baby's umbilical cord blood. This is the blood left in the cord after birth. If you want to save this blood, you must arrange it ahead of time. You can't decide at the last minute. If you haven't already had the Tdap shot during this pregnancy, talk to your doctor about getting it. It will help protect your  against pertussis infection. Follow-up care is a key part of your treatment and safety.  Be sure to make and go to all appointments, and call your doctor if you are having problems. It's also a good idea to know your test results and keep a list of the medicines you take. How can you care for yourself at home? Ease hemorrhoids · Get more liquids, fruits, vegetables, and fiber in your diet. This will help keep your stools soft. · Avoid sitting for too long. Lie on your left side several times a day. · Clean yourself with soft, moist toilet paper. Or you can use witch hazel pads or personal hygiene pads. · If you are uncomfortable, try ice packs. Or you can sit in a warm sitz bath. Do these for 20 minutes at a time, as needed. · Use hydrocortisone cream for pain and itching. Two examples are Anusol and Preparation H Hydrocortisone. · Ask your doctor about taking an over-the-counter stool softener. Consider breastfeeding · Experts recommend that women breastfeed for 1 year or longer. Breast milk is the perfect food for babies. · Breast milk is easier for babies to digest than formula. And it is always available, just the right temperature, and free. · In general, babies who are  are healthier than formula-fed babies. ¨  babies are less likely to get ear infections, colds, diarrhea, and pneumonia. ¨  babies who are fed only breast milk are less likely to get asthma and allergies. ¨  babies are less likely to be obese. ¨  babies are less likely to get diabetes or heart disease. · Women who breastfeed have less bleeding after the birth. Their uteruses also shrink back faster. · Some women who breastfeed lose weight faster. Making milk burns calories. · Breastfeeding can lower your risk of breast cancer, ovarian cancer, and osteoporosis. Decide about circumcision for boys · As you make this decision, it may help to think about your personal, Orthodox, and family traditions.  You get to decide if you will keep your son's penis natural or if he will be circumcised. · If you decide that you would like to have your baby circumcised, talk with your doctor. You can share your concerns about pain. And you can discuss your preferences for anesthesia. Where can you learn more? Go to http://alyse-mayi.info/. Enter X113 in the search box to learn more about \"Weeks 32 to 34 of Your Pregnancy: Care Instructions. \" Current as of: March 16, 2017 Content Version: 11.4 © 3881-5277 Genbook. Care instructions adapted under license by [employer] (which disclaims liability or warranty for this information). If you have questions about a medical condition or this instruction, always ask your healthcare professional. [address] any warranty or liability for your use of this information. Counting Your Baby's Kicks: Care Instructions Your Care Instructions Counting your baby's kicks is one way your doctor can tell that your baby is healthy. Most women-especially in a first pregnancy-feel their baby move for the first time between 16 and 22 weeks. The movement may feel like flutters rather than kicks. Your baby may move more at certain times of the day. When you are active, you may notice less kicking than when you are resting. At your prenatal visits, your doctor will ask whether the baby is active. In your last trimester, your doctor may ask you to count the number of times you feel your baby move. Follow-up care is a key part of your treatment and safety. Be sure to make and go to all appointments, and call your doctor if you are having problems. It's also a good idea to know your test results and keep a list of the medicines you take. How do you count fetal kicks? · A common method of checking your baby's movement is to count the number of kicks or moves you feel in 1 hour.  Ten movements (such as kicks, flutters, or rolls) in 1 hour are normal. Some doctors suggest that you count in the morning until you get to 10 movements. Then you can quit for that day and start again the next day. · Pick your baby's most active time of day to count. This may be any time from morning to evening. · If you do not feel 10 movements in an hour, your baby may be sleeping. Wait for the next hour and count again. When should you call for help? Call your doctor now or seek immediate medical care if: 
? · You noticed that your baby has stopped moving or is moving much less than normal. ? Watch closely for changes in your health, and be sure to contact your doctor if you have any problems. Where can you learn more? Go to http://alyse-mayi.info/. Enter M224 in the search box to learn more about \"Counting Your Baby's Kicks: Care Instructions. \" Current as of: March 16, 2017 Content Version: 11.4 © 5105-0440 [employer]. Care instructions adapted under license by [employer] (which disclaims liability or warranty for this information). If you have questions about a medical condition or this instruction, always ask your healthcare professional. [place] [address] any warranty or liability for your use of this information. Latvian Slot accompanied patient to hospital and remained with her during her stay please excuse him from work today 11/02/2017 Introducing [facility]! [employer] introduces [employer] patient portal. Now you can access parts of your medical record, email your doctor's office, and request medication refills online. 1. In your internet browser, go to [email or web address] 2. Click on the First Time User? Click Here link in the Sign In box. You will see the New Member Sign Up page. 3. Enter your [employer] Access Code exactly as it appears below.  You will not need to use this code after youve completed the sign-up process. If you do not sign up before the expiration date, you must request a new code. · [email or web address] Access Code: [email or web address] Expires: 12/5/2017 10:09 AM 
 
4. Enter the last four digits of your Social Security Number (xxxx) and Date of Birth (mm/dd/yyyy) as indicated and click Submit. You will be taken to the next sign-up page. 5. Create a [email or web address] ID. This will be your [email or web address] login ID and cannot be changed, so think of one that is secure and easy to remember. 6. Create a [email or web address] password. You can change your password at any time. 7. Enter your Password Reset Question and Answer. This can be used at a later time if you forget your password. 8. Enter your e-mail address. You will receive e-mail notification when new information is available in [address] 9. Click Sign Up. You can now view and download portions of your medical record. 10. Click the Download Summary menu link to download a portable copy of your medical information. If you have questions, please visit the Frequently Asked Questions section of the [email or web address] website. Remember, [email or web address] is NOT to be used for urgent needs. For medical emergencies, dial 911. Now available from your iPhone and Android! Providers Seen During Your Hospitalization Provider Specialty Primary office phone Brown Morgan MD Obstetrics & Gynecology [phone] Your Primary Care Physician (PCP) Primary Care Physician Office Phone Office Fax NONE ** None ** ** None ** You are allergic to the following Allergen Reactions Fruit C (Ascorbic Acid) Angioedema Recent Documentation Height Weight BMI OB Status Smoking Status 1.676 m 98.4 kg 35.02 kg/m2 Pregnant Current Some Day Smoker Emergency Contacts Name Discharge Info Relation Home Work Mobile Haylie Palma DISCHARGE CAREGIVER [3] Other Relative [6] [phone] Patient Belongings The following personal items are in your possession at time of discharge: 
                             
 
  
  
 Please provide this summary of care documentation to your next provider. Signatures-by signing, you are acknowledging that this After Visit Summary has been reviewed with you and you have received a copy. Patient Signature:  ____________________________________________________________ Date:  ____________________________________________________________  
  
Alma Delia Placedo Provider Signature:  ____________________________________________________________ Date:  ____________________________________________________________

## 2017-11-02 NOTE — IP AVS SNAPSHOT
Summary of Care Report The Summary of Care report has been created to help improve care coordination. Users with access to [employer] or [address] (Web-based application) may access additional patient information including the Discharge Summary. If you are not currently a [address] user and need more information, please call the number listed below in the [place] [address] section and ask to be connected with Medical Records. Facility Information Name Address Phone [address] [facility] [address] [phone] Patient Information Patient Name Sex  Pina Feliz ([id number]) Female 1990 Discharge Information Admitting Provider Service Area Unit Saúl Hamlin MD / [phone] [address] / [phone] Discharge Provider Discharge Date/Time Discharge Disposition Destination (none) 2017 (Pending) AHR (none) Patient Language Language ENGLISH [13] Non-Hospital Problems as of 2017  Never Reviewed Class Noted - Resolved Last Modified Active Problems IUP (intrauterine pregnancy), incidental  2017 - Present 2017 by Saúl Hamlin MD  
  Entered by Saúl Hamlin MD  
  Vaginal bleeding in pregnancy  2017 - Present 2017 by Saúl Hamlin MD  
  Entered by Saúl Hamlin MD  
  
You are allergic to the following Allergen Reactions Fruit C (Ascorbic Acid) Angioedema Current Discharge Medication List  
  
Notice You have not been prescribed any medications. Follow-up Information Follow up With Details Comments Contact Info None   None (395) Patient stated that they have no PCP Discharge Instructions Weeks 32 to 34 of Your Pregnancy: Care Instructions Your Care Instructions During the last few weeks of your pregnancy, you may have more aches and pains. It's important to rest when you can. Your growing baby is putting more pressure on your bladder. So you may need to urinate more often. Hemorrhoids are also common. These are painful, itchy veins in the rectal area. In the 36th week, most women have a test for group B streptococcus (GBS). GBS is a common bacteria that can live in the vagina and rectum. It can make your baby sick after birth. If you test positive, you will get antibiotics during labor. These will keep your baby from getting the bacteria. You may want to talk with your doctor about banking your baby's umbilical cord blood. This is the blood left in the cord after birth. If you want to save this blood, you must arrange it ahead of time. You can't decide at the last minute. If you haven't already had the Tdap shot during this pregnancy, talk to your doctor about getting it. It will help protect your  against pertussis infection. Follow-up care is a key part of your treatment and safety. Be sure to make and go to all appointments, and call your doctor if you are having problems. It's also a good idea to know your test results and keep a list of the medicines you take. How can you care for yourself at home? Ease hemorrhoids · Get more liquids, fruits, vegetables, and fiber in your diet. This will help keep your stools soft. · Avoid sitting for too long. Lie on your left side several times a day. · Clean yourself with soft, moist toilet paper. Or you can use witch hazel pads or personal hygiene pads. · If you are uncomfortable, try ice packs. Or you can sit in a warm sitz bath. Do these for 20 minutes at a time, as needed. · Use hydrocortisone cream for pain and itching. Two examples are Anusol and Preparation H Hydrocortisone. · Ask your doctor about taking an over-the-counter stool softener. Consider breastfeeding · Experts recommend that women breastfeed for 1 year or longer. Breast milk is the perfect food for babies. · Breast milk is easier for babies to digest than formula. And it is always available, just the right temperature, and free. · In general, babies who are  are healthier than formula-fed babies. ¨  babies are less likely to get ear infections, colds, diarrhea, and pneumonia. ¨  babies who are fed only breast milk are less likely to get asthma and allergies. ¨  babies are less likely to be obese. ¨  babies are less likely to get diabetes or heart disease. · Women who breastfeed have less bleeding after the birth. Their uteruses also shrink back faster. · Some women who breastfeed lose weight faster. Making milk burns calories. · Breastfeeding can lower your risk of breast cancer, ovarian cancer, and osteoporosis. Decide about circumcision for boys · As you make this decision, it may help to think about your personal, Hindu, and family traditions. You get to decide if you will keep your son's penis natural or if he will be circumcised. · If you decide that you would like to have your baby circumcised, talk with your doctor. You can share your concerns about pain. And you can discuss your preferences for anesthesia. Where can you learn more? Go to http://alyse-mayi.info/. Enter Z168 in the search box to learn more about \"Weeks 32 to 34 of Your Pregnancy: Care Instructions. \" Current as of: March 16, 2017 Content Version: 11.4 © 4679-3942 [employer]. Care instructions adapted under license by [employer] (which disclaims liability or warranty for this information). If you have questions about a medical condition or this instruction, always ask your healthcare professional. [place] [address] any warranty or liability for your use of this information. Counting Your Baby's Kicks: Care Instructions Your Care Instructions Counting your baby's kicks is one way your doctor can tell that your baby is healthy. Most women-especially in a first pregnancy-feel their baby move for the first time between 16 and 22 weeks. The movement may feel like flutters rather than kicks. Your baby may move more at certain times of the day. When you are active, you may notice less kicking than when you are resting. At your prenatal visits, your doctor will ask whether the baby is active. In your last trimester, your doctor may ask you to count the number of times you feel your baby move. Follow-up care is a key part of your treatment and safety. Be sure to make and go to all appointments, and call your doctor if you are having problems. It's also a good idea to know your test results and keep a list of the medicines you take. How do you count fetal kicks? · A common method of checking your baby's movement is to count the number of kicks or moves you feel in 1 hour. Ten movements (such as kicks, flutters, or rolls) in 1 hour are normal. Some doctors suggest that you count in the morning until you get to 10 movements. Then you can quit for that day and start again the next day. · Pick your baby's most active time of day to count. This may be any time from morning to evening. · If you do not feel 10 movements in an hour, your baby may be sleeping. Wait for the next hour and count again. When should you call for help? Call your doctor now or seek immediate medical care if: 
? · You noticed that your baby has stopped moving or is moving much less than normal. ? Watch closely for changes in your health, and be sure to contact your doctor if you have any problems. Where can you learn more? Go to http://alyse-mayi.info/. Enter O542 in the search box to learn more about \"Counting Your Baby's Kicks: Care Instructions. \" Current as of: March 16, 2017 Content Version: 11.4 © 7730-9491 Healthwise, [employer]. Care instructions adapted under license by [employer] (which disclaims liability or warranty for this information). If you have questions about a medical condition or this instruction, always ask your healthcare professional. [address] any warranty or liability for your use of this information. Hakan Andrea accompanied patient to hospital and remained with her during her stay please excuse him from work today 11/02/2017 Chart Review Routing History No Routing History on File

## 2017-11-02 NOTE — IP AVS SNAPSHOT
[address] [address] 
[phone] Patient: Maribel Villanueva MRN: [id number] :1990 My Medications Notice You have not been prescribed any medications.

## 2017-11-02 NOTE — DISCHARGE INSTRUCTIONS
Weeks 32 to 34 of Your Pregnancy: Care Instructions  Your Care Instructions    During the last few weeks of your pregnancy, you may have more aches and pains. It's important to rest when you can. Your growing baby is putting more pressure on your bladder. So you may need to urinate more often. Hemorrhoids are also common. These are painful, itchy veins in the rectal area. In the 36th week, most women have a test for group B streptococcus (GBS). GBS is a common bacteria that can live in the vagina and rectum. It can make your baby sick after birth. If you test positive, you will get antibiotics during labor. These will keep your baby from getting the bacteria. You may want to talk with your doctor about banking your baby's umbilical cord blood. This is the blood left in the cord after birth. If you want to save this blood, you must arrange it ahead of time. You can't decide at the last minute. If you haven't already had the Tdap shot during this pregnancy, talk to your doctor about getting it. It will help protect your  against pertussis infection. Follow-up care is a key part of your treatment and safety. Be sure to make and go to all appointments, and call your doctor if you are having problems. It's also a good idea to know your test results and keep a list of the medicines you take. How can you care for yourself at home? Ease hemorrhoids  · Get more liquids, fruits, vegetables, and fiber in your diet. This will help keep your stools soft. · Avoid sitting for too long. Lie on your left side several times a day. · Clean yourself with soft, moist toilet paper. Or you can use witch hazel pads or personal hygiene pads. · If you are uncomfortable, try ice packs. Or you can sit in a warm sitz bath. Do these for 20 minutes at a time, as needed. · Use hydrocortisone cream for pain and itching. Two examples are Anusol and Preparation H Hydrocortisone.   · Ask your doctor about taking an over-the-counter stool softener. Consider breastfeeding  · Experts recommend that women breastfeed for 1 year or longer. Breast milk is the perfect food for babies. · Breast milk is easier for babies to digest than formula. And it is always available, just the right temperature, and free. · In general, babies who are  are healthier than formula-fed babies. ¨  babies are less likely to get ear infections, colds, diarrhea, and pneumonia. ¨  babies who are fed only breast milk are less likely to get asthma and allergies. ¨  babies are less likely to be obese. ¨  babies are less likely to get diabetes or heart disease. · Women who breastfeed have less bleeding after the birth. Their uteruses also shrink back faster. · Some women who breastfeed lose weight faster. Making milk burns calories. · Breastfeeding can lower your risk of breast cancer, ovarian cancer, and osteoporosis. Decide about circumcision for boys  · As you make this decision, it may help to think about your personal, Holiness, and family traditions. You get to decide if you will keep your son's penis natural or if he will be circumcised. · If you decide that you would like to have your baby circumcised, talk with your doctor. You can share your concerns about pain. And you can discuss your preferences for anesthesia. Where can you learn more? Go to http://alyse-mayi.info/. Enter O855 in the search box to learn more about \"Weeks 32 to 34 of Your Pregnancy: Care Instructions. \"  Current as of: March 16, 2017  Content Version: 11.4  © 4063-0433 [employer]. Care instructions adapted under license by [employer] (which disclaims liability or warranty for this information).  If you have questions about a medical condition or this instruction, always ask your healthcare professional. [place] [address] any warranty or liability for your use of this information. Counting Your Baby's Kicks: Care Instructions  Your Care Instructions    Counting your baby's kicks is one way your doctor can tell that your baby is healthy. Most women-especially in a first pregnancy-feel their baby move for the first time between 16 and 22 weeks. The movement may feel like flutters rather than kicks. Your baby may move more at certain times of the day. When you are active, you may notice less kicking than when you are resting. At your prenatal visits, your doctor will ask whether the baby is active. In your last trimester, your doctor may ask you to count the number of times you feel your baby move. Follow-up care is a key part of your treatment and safety. Be sure to make and go to all appointments, and call your doctor if you are having problems. It's also a good idea to know your test results and keep a list of the medicines you take. How do you count fetal kicks? · A common method of checking your baby's movement is to count the number of kicks or moves you feel in 1 hour. Ten movements (such as kicks, flutters, or rolls) in 1 hour are normal. Some doctors suggest that you count in the morning until you get to 10 movements. Then you can quit for that day and start again the next day. · Pick your baby's most active time of day to count. This may be any time from morning to evening. · If you do not feel 10 movements in an hour, your baby may be sleeping. Wait for the next hour and count again. When should you call for help? Call your doctor now or seek immediate medical care if:  ? · You noticed that your baby has stopped moving or is moving much less than normal.   ? Watch closely for changes in your health, and be sure to contact your doctor if you have any problems. Where can you learn more? Go to http://alyse-mayi.info/. Enter O971 in the search box to learn more about \"Counting Your Baby's Kicks: Care Instructions. \"  Current as of: March 16, 2017  Content Version: 11.4  © 5786-6530 Healthwise, [employer]. Care instructions adapted under license by [employer] (which disclaims liability or warranty for this information). If you have questions about a medical condition or this instruction, always ask your healthcare professional. [address] any warranty or liability for your use of this information.       Jonn Hermosillo accompanied patient to hospital and remained with her during her stay please excuse him from work today 11/02/2017

## 2017-11-26 ENCOUNTER — HOSPITAL ENCOUNTER (INPATIENT)
Age: 27
LOS: 4 days | Discharge: HOME | DRG: 540 | End: 2017-11-30
Attending: OBSTETRICS & GYNECOLOGY | Admitting: OBSTETRICS & GYNECOLOGY
Payer: MEDICAID

## 2017-11-26 PROCEDURE — 36415 COLL VENOUS BLD VENIPUNCTURE: CPT | Performed by: OBSTETRICS & GYNECOLOGY

## 2017-11-26 PROCEDURE — 99282 EMERGENCY DEPT VISIT SF MDM: CPT

## 2017-11-26 PROCEDURE — 75410000002 HC LABOR FEE PER 1 HR

## 2017-11-26 PROCEDURE — 85025 COMPLETE CBC W/AUTO DIFF WBC: CPT | Performed by: OBSTETRICS & GYNECOLOGY

## 2017-11-26 PROCEDURE — 86900 BLOOD TYPING SEROLOGIC ABO: CPT | Performed by: OBSTETRICS & GYNECOLOGY

## 2017-11-26 PROCEDURE — 59025 FETAL NON-STRESS TEST: CPT

## 2017-11-26 PROCEDURE — 96360 HYDRATION IV INFUSION INIT: CPT

## 2017-11-26 PROCEDURE — 65270000029 HC RM PRIVATE

## 2017-11-26 NOTE — IP AVS SNAPSHOT
Summary of Care Report The Summary of Care report has been created to help improve care coordination. Users with access to [employer] or [address] (Web-based application) may access additional patient information including the Discharge Summary. If you are not currently a [address] user and need more information, please call the number listed below in the [place] [address] section and ask to be connected with Medical Records. Facility Information Name Address Phone [address] [address] [phone] Patient Information Patient Name Sex  Sravanthi Hale ([id number]) Female 1990 Discharge Information Admitting Provider Service Area Unit Nora Chacon MD / [phone] [address] / [phone] Discharge Provider Discharge Date/Time Discharge Disposition Destination (none) 2017 (Pending) AHR (none) Patient Language Language ENGLISH [13] Hospital Problems as of 2017  Reviewed: 2017 12:45 AM by Bull Hearn MD  
  
  
  
 Class Noted - Resolved Last Modified POA Active Problems IUP (intrauterine pregnancy), incidental  2017 - Present 2017 by Nora Chacon MD Unknown Entered by Nora Chacon MD  
  
Non-Hospital Problems as of 2017  Reviewed: 2017 12:45 AM by Bull Hearn MD  
  
  
  
 Class Noted - Resolved Last Modified Active Problems Vaginal bleeding in pregnancy  2017 - Present 2017 by Nora Chacon MD  
  Entered by Nora Chacon MD  
  
You are allergic to the following Allergen Reactions Fruit C (Ascorbic Acid) Angioedema Current Discharge Medication List  
  
START taking these medications Dose & Instructions Dispensing Information Comments  
 ibuprofen 800 mg tablet Commonly known as:  MOTRIN Dose:  800 mg Take 1 Tab by mouth every eight (8) hours as needed. Indications: Pain Quantity:  60 Tab Refills:  1  
   
 oxyCODONE-acetaminophen 5-325 mg per tablet Commonly known as:  PERCOCET Dose:  2 Tab Take 2 Tabs by mouth every four (4) hours as needed. Max Daily Amount: 12 Tabs. Quantity:  30 Tab Refills:  0 Surgery Information ID Date/Time Status Primary Surgeon All Procedures Location [id number] 2017 [address] [place] [address] MD Yvonne  SECTION THE [facility] L&D OR Follow-up Information Follow up With Details Comments Contact Info None   None (395) Patient stated that they have no PCP Discharge Instructions POST DELIVERY DISCHARGE INSTRUCTIONS Name: Lori Pitt YOB: 1990 Primary Diagnosis: Active Problems: 
  IUP (intrauterine pregnancy), incidental (2017) General:  
 
Diet/Diet Restrictions: 
Eight 8-ounce glasses of fluid daily (water, juices); avoid excessive caffeine intake. Meals/snacks as desired which are high in fiber and carbohydrates and low in fat and cholesterol. Physical Activity / Restrictions / Safety:  
 
Avoid heavy lifting, no more that 8 lbs. For 2-3 weeks; limit use of stairs to 2 times daily for the first week home. No driving for one week. Avoid intercourse 4-6 weeks, no douching or tampon use. Check with obstetrician before starting or resuming an exercise program.    
 
 
Discharge Instructions/Special Treatment/Home Care Needs:  
 
Continue prenatal vitamins. Continue to use squirt bottle with warm water on your episiotomy after each bathroom use until bleeding stops. If steri-strips applied to your incision, remove in 7-10 days. Call your doctor for the following:  
 
Fever over 101 degrees by mouth. Vaginal bleeding heavier than a normal menstrual period or clot larger than a golf ball. Red streaks or increased swelling of legs, painful red streaks on your breast. 
Painful urination, constipation and increased pain or swelling or discharge with your incision. If you feel extremely anxious or overwhelmed. If you have thoughts of harming yourself and/or your baby. Pain Management:  
 
Pain Management:  
Take Acetaminophen (Tylenol) or Ibuprofen (Advil, Motrin), as directed for pain. Use a warm Sitz bath 3 times daily to relieve episiotomy or hemorrhoidal discomfort. Heating pad to  incision as needed. For hemorrhoidal discomfort, use Tucks and Anusol cream as needed and directed. Follow-Up Care: These are general instructions for a healthy lifestyle: No smoking/ No tobacco products/ Avoid exposure to second hand smoke Surgeon General's Warning:  Quitting smoking now greatly reduces serious risk to your health. Obesity, smoking, and sedentary lifestyle greatly increases your risk for illness A healthy diet, regular physical exercise & weight monitoring are important for maintaining a healthy lifestyle Recognize signs and symptoms of STROKE: 
 
F-face looks uneven A-arms unable to move or move unevenly S-speech slurred or non-existent T-time-call 911 as soon as signs and symptoms begin-DO NOT go Back to bed or wait to see if you get better-TIME IS BRAIN. Signed By: Berta Egan                                                                                                   Date: 2017 Time: 1:36 PM 
 
Patient armband removed and given to patient to take home. Patient was informed of the privacy risks if armband lost or stolen Chart Review Routing History No Routing History on File

## 2017-11-26 NOTE — IP AVS SNAPSHOT
[address] [place] [id number] 
[phone] Patient: Avery Reese MRN: [id number] :1990 About your hospitalization You were admitted on:  2017 You last received care in the:  [address] [place] [address] You were discharged on:  2017 Why you were hospitalized Your primary diagnosis was:  Not on File Your diagnoses also included:  Iup (Intrauterine Pregnancy), Incidental  
  
Things You Need To Do (next 8 weeks) Follow up with None Where:  None (395) Patient stated that they have no PCP Discharge Orders None A check drew indicates which time of day the medication should be taken. My Medications TAKE these medications as instructed Instructions Each Dose to Equal  
 Morning Noon Evening Bedtime  
 ibuprofen 800 mg tablet Commonly known as:  MOTRIN Your last dose was: Your next dose is: Take 1 Tab by mouth every eight (8) hours as needed. Indications: Pain 800 mg  
    
   
   
   
  
 oxyCODONE-acetaminophen 5-325 mg per tablet Commonly known as:  PERCOCET Your last dose was: Your next dose is: Take 2 Tabs by mouth every four (4) hours as needed. Max Daily Amount: 12 Tabs. 2 Tab Where to Get Your Medications These medications were sent to [address] [place], [place] [address] [place] [address] [address] Phone:  [phone]  
  ibuprofen 800 mg tablet Information on where to get these meds will be given to you by the nurse or doctor. ! Ask your nurse or doctor about these medications  
  oxyCODONE-acetaminophen 5-325 mg per tablet Discharge Instructions POST DELIVERY DISCHARGE INSTRUCTIONS Name: Avery Reese YOB: 1990 Primary Diagnosis: Active Problems: 
  IUP (intrauterine pregnancy), incidental (2017) General:  
 
Diet/Diet Restrictions: 
Eight 8-ounce glasses of fluid daily (water, juices); avoid excessive caffeine intake. Meals/snacks as desired which are high in fiber and carbohydrates and low in fat and cholesterol. Physical Activity / Restrictions / Safety:  
 
Avoid heavy lifting, no more that 8 lbs. For 2-3 weeks; limit use of stairs to 2 times daily for the first week home. No driving for one week. Avoid intercourse 4-6 weeks, no douching or tampon use. Check with obstetrician before starting or resuming an exercise program.    
 
 
Discharge Instructions/Special Treatment/Home Care Needs:  
 
Continue prenatal vitamins. Continue to use squirt bottle with warm water on your episiotomy after each bathroom use until bleeding stops. If steri-strips applied to your incision, remove in 7-10 days. Call your doctor for the following:  
 
Fever over 101 degrees by mouth. Vaginal bleeding heavier than a normal menstrual period or clot larger than a golf ball. Red streaks or increased swelling of legs, painful red streaks on your breast. 
Painful urination, constipation and increased pain or swelling or discharge with your incision. If you feel extremely anxious or overwhelmed. If you have thoughts of harming yourself and/or your baby. Pain Management:  
 
Pain Management:  
Take Acetaminophen (Tylenol) or Ibuprofen (Advil, Motrin), as directed for pain. Use a warm Sitz bath 3 times daily to relieve episiotomy or hemorrhoidal discomfort. Heating pad to  incision as needed. For hemorrhoidal discomfort, use Tucks and Anusol cream as needed and directed. Follow-Up Care: These are general instructions for a healthy lifestyle: No smoking/ No tobacco products/ Avoid exposure to second hand smoke Surgeon General's Warning:  Quitting smoking now greatly reduces serious risk to your health. Obesity, smoking, and sedentary lifestyle greatly increases your risk for illness A healthy diet, regular physical exercise & weight monitoring are important for maintaining a healthy lifestyle Recognize signs and symptoms of STROKE: 
 
F-face looks uneven A-arms unable to move or move unevenly S-speech slurred or non-existent T-time-call 911 as soon as signs and symptoms begin-DO NOT go Back to bed or wait to see if you get better-TIME IS BRAIN. Signed By: Mahad Obregon                                                                                                   Date: 11/29/2017 Time: 1:36 PM 
 
Patient armband removed and given to patient to take home. Patient was informed of the privacy risks if armband lost or stolen Introducing [facility]! Td Linder introduces Textingly patient portal. Now you can access parts of your medical record, email your doctor's office, and request medication refills online. 1. In your internet browser, go to [email or web address] 2. Click on the First Time User? Click Here link in the Sign In box. You will see the New Member Sign Up page. 3. Enter your Textingly Access Code exactly as it appears below. You will not need to use this code after youve completed the sign-up process. If you do not sign up before the expiration date, you must request a new code. · Textingly Access Code: [email or web address] Expires: 12/5/2017  9:09 AM 
 
4. Enter the last four digits of your Social Security Number (xxxx) and Date of Birth (mm/dd/yyyy) as indicated and click Submit. You will be taken to the next sign-up page. 5. Create a Textingly ID. This will be your Textingly login ID and cannot be changed, so think of one that is secure and easy to remember. 6. Create a Textingly password. You can change your password at any time. 7. Enter your Password Reset Question and Answer.  This can be used at a later time if you forget your password. 8. Enter your e-mail address. You will receive e-mail notification when new information is available in [address] 9. Click Sign Up. You can now view and download portions of your medical record. 10. Click the Download Summary menu link to download a portable copy of your medical information. If you have questions, please visit the Frequently Asked Questions section of the [employer] website. Remember, [employer] is NOT to be used for urgent needs. For medical emergencies, dial 911. Now available from your iPhone and Android! Providers Seen During Your Hospitalization Provider Specialty Primary office phone Jae Mcdaniel MD Obstetrics & Gynecology [phone] Your Primary Care Physician (PCP) Primary Care Physician Office Phone Office Fax NONE ** None ** ** None ** You are allergic to the following Allergen Reactions Fruit C (Ascorbic Acid) Angioedema Recent Documentation Height Weight BMI OB Status Smoking Status 1.676 m 102.5 kg 36.48 kg/m2 Pregnant Current Some Day Smoker Emergency Contacts Name Discharge Info Relation Home Work Mobile MinervaHaylie DISCHARGE CAREGIVER [3] Other Relative [6] [phone] Patient Belongings The following personal items are in your possession at time of discharge: 
  Dental Appliances: None         Home Medications: None   Jewelry: With patient, Necklace  Clothing: With patient    Other Valuables: Cell Phone, With patient Please provide this summary of care documentation to your next provider. Signatures-by signing, you are acknowledging that this After Visit Summary has been reviewed with you and you have received a copy. Patient Signature:  ____________________________________________________________  Date:  ____________________________________________________________  
  
Sav Diaz    
    
 Provider Signature:  ____________________________________________________________ Date:  ____________________________________________________________

## 2017-11-27 ENCOUNTER — ANESTHESIA EVENT (OUTPATIENT)
Dept: LABOR AND DELIVERY | Age: 27
DRG: 540 | End: 2017-11-27
Payer: MEDICAID

## 2017-11-27 ENCOUNTER — ANESTHESIA (OUTPATIENT)
Dept: LABOR AND DELIVERY | Age: 27
DRG: 540 | End: 2017-11-27
Payer: MEDICAID

## 2017-11-27 LAB
ABO + RH BLD: NORMAL
BASOPHILS # BLD: 0 K/UL (ref 0–0.06)
BASOPHILS NFR BLD: 0 % (ref 0–2)
BLOOD GROUP ANTIBODIES SERPL: NORMAL
DIFFERENTIAL METHOD BLD: ABNORMAL
EOSINOPHIL # BLD: 0 K/UL (ref 0–0.4)
EOSINOPHIL NFR BLD: 0 % (ref 0–5)
ERYTHROCYTE [DISTWIDTH] IN BLOOD BY AUTOMATED COUNT: 13.7 % (ref 11.6–14.5)
HCT VFR BLD AUTO: 35 % (ref 35–45)
HGB BLD-MCNC: 11.9 G/DL (ref 12–16)
LYMPHOCYTES # BLD: 3 K/UL (ref 0.9–3.6)
LYMPHOCYTES NFR BLD: 26 % (ref 21–52)
MCH RBC QN AUTO: 29.9 PG (ref 24–34)
MCHC RBC AUTO-ENTMCNC: 34 G/DL (ref 31–37)
MCV RBC AUTO: 87.9 FL (ref 74–97)
MONOCYTES # BLD: 1.1 K/UL (ref 0.05–1.2)
MONOCYTES NFR BLD: 9 % (ref 3–10)
NEUTS SEG # BLD: 7.4 K/UL (ref 1.8–8)
NEUTS SEG NFR BLD: 65 % (ref 40–73)
PLATELET # BLD AUTO: 253 K/UL (ref 135–420)
PMV BLD AUTO: 11.4 FL (ref 9.2–11.8)
RBC # BLD AUTO: 3.98 M/UL (ref 4.2–5.3)
SPECIMEN EXP DATE BLD: NORMAL
WBC # BLD AUTO: 11.4 K/UL (ref 4.6–13.2)

## 2017-11-27 PROCEDURE — 74011250636 HC RX REV CODE- 250/636

## 2017-11-27 PROCEDURE — 77030032490 HC SLV COMPR SCD KNE COVD -B: Performed by: OBSTETRICS & GYNECOLOGY

## 2017-11-27 PROCEDURE — 77030008459 HC STPLR SKN COOP -B: Performed by: OBSTETRICS & GYNECOLOGY

## 2017-11-27 PROCEDURE — 77030031139 HC SUT VCRL2 J&J -A: Performed by: OBSTETRICS & GYNECOLOGY

## 2017-11-27 PROCEDURE — 77030016154: Performed by: OBSTETRICS & GYNECOLOGY

## 2017-11-27 PROCEDURE — 77030008477 HC STYL SATN SLP COVD -A: Performed by: ANESTHESIOLOGY

## 2017-11-27 PROCEDURE — 77030009413 HC ELECTRD SCALP COVD -A

## 2017-11-27 PROCEDURE — 74011250636 HC RX REV CODE- 250/636: Performed by: OBSTETRICS & GYNECOLOGY

## 2017-11-27 PROCEDURE — 75410000003 HC RECOV DEL/VAG/CSECN EA 0.5 HR

## 2017-11-27 PROCEDURE — 77030011640 HC PAD GRND REM COVD -A: Performed by: OBSTETRICS & GYNECOLOGY

## 2017-11-27 PROCEDURE — 77030006643: Performed by: ANESTHESIOLOGY

## 2017-11-27 PROCEDURE — 65270000029 HC RM PRIVATE

## 2017-11-27 PROCEDURE — 77030034849

## 2017-11-27 PROCEDURE — 77030033269 HC SLV COMPR SCD KNE2 CARD -B

## 2017-11-27 PROCEDURE — 77030018749 HC HK AMNIO DISP DERY -A

## 2017-11-27 PROCEDURE — 76060000033 HC ANESTHESIA 1 TO 1.5 HR: Performed by: OBSTETRICS & GYNECOLOGY

## 2017-11-27 PROCEDURE — 75410000003 HC RECOV DEL/VAG/CSECN EA 0.5 HR: Performed by: OBSTETRICS & GYNECOLOGY

## 2017-11-27 PROCEDURE — 77030011265 HC ELECTRD BLD HEX COVD -A: Performed by: OBSTETRICS & GYNECOLOGY

## 2017-11-27 PROCEDURE — 74011250636 HC RX REV CODE- 250/636: Performed by: ANESTHESIOLOGY

## 2017-11-27 PROCEDURE — 76010000392 HC C SECN EA ADDL 0.5 HR: Performed by: OBSTETRICS & GYNECOLOGY

## 2017-11-27 PROCEDURE — 77030010848 HC CATH INTUTR PRSS KOLB -B

## 2017-11-27 PROCEDURE — 77030008683 HC TU ET CUF COVD -A: Performed by: ANESTHESIOLOGY

## 2017-11-27 PROCEDURE — 76010000391 HC C SECN FIRST 1 HR: Performed by: OBSTETRICS & GYNECOLOGY

## 2017-11-27 RX ORDER — OXYTOCIN/RINGER'S LACTATE 20/1000 ML
PLASTIC BAG, INJECTION (ML) INTRAVENOUS
Status: DISCONTINUED | OUTPATIENT
Start: 2017-11-27 | End: 2017-11-27 | Stop reason: HOSPADM

## 2017-11-27 RX ORDER — FENTANYL CITRATE 50 UG/ML
INJECTION, SOLUTION INTRAMUSCULAR; INTRAVENOUS AS NEEDED
Status: DISCONTINUED | OUTPATIENT
Start: 2017-11-27 | End: 2017-11-27 | Stop reason: HOSPADM

## 2017-11-27 RX ORDER — ZOLPIDEM TARTRATE 5 MG/1
5 TABLET ORAL
Status: DISCONTINUED | OUTPATIENT
Start: 2017-11-27 | End: 2017-11-29 | Stop reason: HOSPADM

## 2017-11-27 RX ORDER — OXYCODONE AND ACETAMINOPHEN 5; 325 MG/1; MG/1
2 TABLET ORAL
Status: DISCONTINUED | OUTPATIENT
Start: 2017-11-27 | End: 2017-11-29 | Stop reason: HOSPADM

## 2017-11-27 RX ORDER — MISOPROSTOL 100 UG/1
TABLET ORAL
Status: DISPENSED
Start: 2017-11-27 | End: 2017-11-27

## 2017-11-27 RX ORDER — LIDOCAINE HYDROCHLORIDE 10 MG/ML
20 INJECTION, SOLUTION EPIDURAL; INFILTRATION; INTRACAUDAL; PERINEURAL AS NEEDED
Status: DISCONTINUED | OUTPATIENT
Start: 2017-11-27 | End: 2017-11-27 | Stop reason: HOSPADM

## 2017-11-27 RX ORDER — IBUPROFEN 400 MG/1
800 TABLET ORAL
Status: DISCONTINUED | OUTPATIENT
Start: 2017-11-27 | End: 2017-11-29 | Stop reason: HOSPADM

## 2017-11-27 RX ORDER — SODIUM CHLORIDE, SODIUM LACTATE, POTASSIUM CHLORIDE, CALCIUM CHLORIDE 600; 310; 30; 20 MG/100ML; MG/100ML; MG/100ML; MG/100ML
125 INJECTION, SOLUTION INTRAVENOUS CONTINUOUS
Status: DISCONTINUED | OUTPATIENT
Start: 2017-11-27 | End: 2017-11-29 | Stop reason: HOSPADM

## 2017-11-27 RX ORDER — SODIUM CHLORIDE 0.9 % (FLUSH) 0.9 %
5-10 SYRINGE (ML) INJECTION AS NEEDED
Status: DISCONTINUED | OUTPATIENT
Start: 2017-11-27 | End: 2017-11-29 | Stop reason: HOSPADM

## 2017-11-27 RX ORDER — ACETAMINOPHEN 325 MG/1
650 TABLET ORAL
Status: DISCONTINUED | OUTPATIENT
Start: 2017-11-27 | End: 2017-11-29 | Stop reason: HOSPADM

## 2017-11-27 RX ORDER — BUTORPHANOL TARTRATE 2 MG/ML
2 INJECTION INTRAMUSCULAR; INTRAVENOUS
Status: DISCONTINUED | OUTPATIENT
Start: 2017-11-27 | End: 2017-11-27 | Stop reason: HOSPADM

## 2017-11-27 RX ORDER — HYDROMORPHONE HYDROCHLORIDE 1 MG/ML
1 INJECTION, SOLUTION INTRAMUSCULAR; INTRAVENOUS; SUBCUTANEOUS
Status: DISCONTINUED | OUTPATIENT
Start: 2017-11-27 | End: 2017-11-27

## 2017-11-27 RX ORDER — OXYTOCIN/RINGER'S LACTATE 20/1000 ML
500 PLASTIC BAG, INJECTION (ML) INTRAVENOUS ONCE
Status: DISCONTINUED | OUTPATIENT
Start: 2017-11-27 | End: 2017-11-27 | Stop reason: HOSPADM

## 2017-11-27 RX ORDER — HYDROMORPHONE HYDROCHLORIDE 2 MG/ML
1 INJECTION, SOLUTION INTRAMUSCULAR; INTRAVENOUS; SUBCUTANEOUS
Status: DISCONTINUED | OUTPATIENT
Start: 2017-11-27 | End: 2017-11-27 | Stop reason: HOSPADM

## 2017-11-27 RX ORDER — CEFAZOLIN SODIUM 2 G/50ML
2 SOLUTION INTRAVENOUS ONCE
Status: COMPLETED | OUTPATIENT
Start: 2017-11-27 | End: 2017-11-27

## 2017-11-27 RX ORDER — TERBUTALINE SULFATE 1 MG/ML
0.25 INJECTION SUBCUTANEOUS
Status: DISCONTINUED | OUTPATIENT
Start: 2017-11-27 | End: 2017-11-27 | Stop reason: HOSPADM

## 2017-11-27 RX ORDER — PROMETHAZINE HYDROCHLORIDE 25 MG/ML
25 INJECTION, SOLUTION INTRAMUSCULAR; INTRAVENOUS
Status: DISCONTINUED | OUTPATIENT
Start: 2017-11-27 | End: 2017-11-29 | Stop reason: HOSPADM

## 2017-11-27 RX ORDER — ONDANSETRON 2 MG/ML
INJECTION INTRAMUSCULAR; INTRAVENOUS AS NEEDED
Status: DISCONTINUED | OUTPATIENT
Start: 2017-11-27 | End: 2017-11-27 | Stop reason: HOSPADM

## 2017-11-27 RX ORDER — AMOXICILLIN 250 MG
1 CAPSULE ORAL
Status: DISCONTINUED | OUTPATIENT
Start: 2017-11-27 | End: 2017-11-29 | Stop reason: HOSPADM

## 2017-11-27 RX ORDER — METHYLERGONOVINE MALEATE 0.2 MG/ML
0.2 INJECTION INTRAVENOUS AS NEEDED
Status: DISCONTINUED | OUTPATIENT
Start: 2017-11-27 | End: 2017-11-27 | Stop reason: HOSPADM

## 2017-11-27 RX ORDER — KETOROLAC TROMETHAMINE 30 MG/ML
30 INJECTION, SOLUTION INTRAMUSCULAR; INTRAVENOUS EVERY 6 HOURS
Status: COMPLETED | OUTPATIENT
Start: 2017-11-27 | End: 2017-11-28

## 2017-11-27 RX ORDER — MINERAL OIL
30 OIL (ML) ORAL AS NEEDED
Status: DISCONTINUED | OUTPATIENT
Start: 2017-11-27 | End: 2017-11-27 | Stop reason: HOSPADM

## 2017-11-27 RX ORDER — NALBUPHINE HYDROCHLORIDE 10 MG/ML
10 INJECTION, SOLUTION INTRAMUSCULAR; INTRAVENOUS; SUBCUTANEOUS
Status: DISCONTINUED | OUTPATIENT
Start: 2017-11-27 | End: 2017-11-27 | Stop reason: HOSPADM

## 2017-11-27 RX ORDER — FENTANYL CITRATE 50 UG/ML
50 INJECTION, SOLUTION INTRAMUSCULAR; INTRAVENOUS
Status: DISCONTINUED | OUTPATIENT
Start: 2017-11-27 | End: 2017-11-29 | Stop reason: HOSPADM

## 2017-11-27 RX ORDER — DEXAMETHASONE SODIUM PHOSPHATE 4 MG/ML
INJECTION, SOLUTION INTRA-ARTICULAR; INTRALESIONAL; INTRAMUSCULAR; INTRAVENOUS; SOFT TISSUE AS NEEDED
Status: DISCONTINUED | OUTPATIENT
Start: 2017-11-27 | End: 2017-11-27 | Stop reason: HOSPADM

## 2017-11-27 RX ORDER — PROPOFOL 10 MG/ML
INJECTION, EMULSION INTRAVENOUS AS NEEDED
Status: DISCONTINUED | OUTPATIENT
Start: 2017-11-27 | End: 2017-11-27 | Stop reason: HOSPADM

## 2017-11-27 RX ORDER — CEFAZOLIN SODIUM 2 G/50ML
SOLUTION INTRAVENOUS
Status: COMPLETED
Start: 2017-11-27 | End: 2017-11-27

## 2017-11-27 RX ORDER — OXYTOCIN/RINGER'S LACTATE 20/1000 ML
125 PLASTIC BAG, INJECTION (ML) INTRAVENOUS CONTINUOUS
Status: DISCONTINUED | OUTPATIENT
Start: 2017-11-27 | End: 2017-11-27 | Stop reason: HOSPADM

## 2017-11-27 RX ORDER — OXYTOCIN/RINGER'S LACTATE 20/1000 ML
PLASTIC BAG, INJECTION (ML) INTRAVENOUS
Status: COMPLETED
Start: 2017-11-27 | End: 2017-11-27

## 2017-11-27 RX ORDER — SUCCINYLCHOLINE CHLORIDE 20 MG/ML
INJECTION INTRAMUSCULAR; INTRAVENOUS AS NEEDED
Status: DISCONTINUED | OUTPATIENT
Start: 2017-11-27 | End: 2017-11-27 | Stop reason: HOSPADM

## 2017-11-27 RX ORDER — FLUMAZENIL 0.1 MG/ML
0.2 INJECTION INTRAVENOUS
Status: DISCONTINUED | OUTPATIENT
Start: 2017-11-27 | End: 2017-11-29 | Stop reason: HOSPADM

## 2017-11-27 RX ORDER — SODIUM CHLORIDE, SODIUM LACTATE, POTASSIUM CHLORIDE, CALCIUM CHLORIDE 600; 310; 30; 20 MG/100ML; MG/100ML; MG/100ML; MG/100ML
125 INJECTION, SOLUTION INTRAVENOUS CONTINUOUS
Status: DISCONTINUED | OUTPATIENT
Start: 2017-11-27 | End: 2017-11-27 | Stop reason: HOSPADM

## 2017-11-27 RX ORDER — NALOXONE HYDROCHLORIDE 0.4 MG/ML
0.4 INJECTION, SOLUTION INTRAMUSCULAR; INTRAVENOUS; SUBCUTANEOUS AS NEEDED
Status: DISCONTINUED | OUTPATIENT
Start: 2017-11-27 | End: 2017-11-29 | Stop reason: HOSPADM

## 2017-11-27 RX ADMIN — FENTANYL CITRATE 50 MCG: 50 INJECTION, SOLUTION INTRAMUSCULAR; INTRAVENOUS at 00:53

## 2017-11-27 RX ADMIN — FENTANYL CITRATE 50 MCG: 50 INJECTION, SOLUTION INTRAMUSCULAR; INTRAVENOUS at 00:37

## 2017-11-27 RX ADMIN — FENTANYL CITRATE 50 MCG: 50 INJECTION, SOLUTION INTRAMUSCULAR; INTRAVENOUS at 01:19

## 2017-11-27 RX ADMIN — SODIUM CHLORIDE, SODIUM LACTATE, POTASSIUM CHLORIDE, AND CALCIUM CHLORIDE: 600; 310; 30; 20 INJECTION, SOLUTION INTRAVENOUS at 00:21

## 2017-11-27 RX ADMIN — Medication: at 00:37

## 2017-11-27 RX ADMIN — SODIUM CHLORIDE, SODIUM LACTATE, POTASSIUM CHLORIDE, AND CALCIUM CHLORIDE 125 ML: 600; 310; 30; 20 INJECTION, SOLUTION INTRAVENOUS at 12:09

## 2017-11-27 RX ADMIN — CEFAZOLIN SODIUM 2 G: 2 SOLUTION INTRAVENOUS at 00:23

## 2017-11-27 RX ADMIN — FENTANYL CITRATE 50 MCG: 50 INJECTION, SOLUTION INTRAMUSCULAR; INTRAVENOUS at 01:33

## 2017-11-27 RX ADMIN — SUCCINYLCHOLINE CHLORIDE 140 MG: 20 INJECTION INTRAMUSCULAR; INTRAVENOUS at 00:31

## 2017-11-27 RX ADMIN — KETOROLAC TROMETHAMINE 30 MG: 30 INJECTION, SOLUTION INTRAMUSCULAR at 11:32

## 2017-11-27 RX ADMIN — KETOROLAC TROMETHAMINE 30 MG: 30 INJECTION, SOLUTION INTRAMUSCULAR at 17:15

## 2017-11-27 RX ADMIN — FENTANYL CITRATE 50 MCG: 50 INJECTION, SOLUTION INTRAMUSCULAR; INTRAVENOUS at 01:43

## 2017-11-27 RX ADMIN — SODIUM CHLORIDE, SODIUM LACTATE, POTASSIUM CHLORIDE, AND CALCIUM CHLORIDE 125 ML: 600; 310; 30; 20 INJECTION, SOLUTION INTRAVENOUS at 21:00

## 2017-11-27 RX ADMIN — Medication: at 01:29

## 2017-11-27 RX ADMIN — PROPOFOL 200 MG: 10 INJECTION, EMULSION INTRAVENOUS at 00:31

## 2017-11-27 RX ADMIN — ONDANSETRON 4 MG: 2 INJECTION INTRAMUSCULAR; INTRAVENOUS at 00:39

## 2017-11-27 RX ADMIN — FENTANYL CITRATE 50 MCG: 50 INJECTION, SOLUTION INTRAMUSCULAR; INTRAVENOUS at 01:27

## 2017-11-27 RX ADMIN — DEXAMETHASONE SODIUM PHOSPHATE 4 MG: 4 INJECTION, SOLUTION INTRA-ARTICULAR; INTRALESIONAL; INTRAMUSCULAR; INTRAVENOUS; SOFT TISSUE at 00:39

## 2017-11-27 RX ADMIN — FENTANYL CITRATE 50 MCG: 50 INJECTION, SOLUTION INTRAMUSCULAR; INTRAVENOUS at 00:44

## 2017-11-27 RX ADMIN — HYDROMORPHONE HYDROCHLORIDE: 10 INJECTION, SOLUTION INTRAMUSCULAR; INTRAVENOUS; SUBCUTANEOUS at 01:53

## 2017-11-27 RX ADMIN — FENTANYL CITRATE 50 MCG: 50 INJECTION, SOLUTION INTRAMUSCULAR; INTRAVENOUS at 00:40

## 2017-11-27 RX ADMIN — KETOROLAC TROMETHAMINE 30 MG: 30 INJECTION, SOLUTION INTRAMUSCULAR at 04:58

## 2017-11-27 NOTE — ADT AUTH CERT NOTES
Patient Demographics        Patient Name [address] Sex  Address Phone     Twan Tee [id number] Female 1990 [address] [place] [address] [phone] (Home)           CSN:       [id number]           Admit Date: Admit Time Room Bed       2017  7:18  [57597] 01 [[id number]]           Attending Providers        Provider Pager From To       Leland Murry MD  17       ADM  CSECTION   Birth History   Birth Information   Birth Length: 55 cm   Birth Weight: 2.182 kg   Birth Head Circ: 30.5 cm   Gestational Age: 40 3/7 weeks   Delivery Method: , Low Transverse    APGARs   1 Minute: 8   5 Minute: 9

## 2017-11-27 NOTE — PERIOP NOTES
Bedside report to receiving nurse Jose Lucia RN, current vital signs noted below. Dressing dry and intact. Family in room. No further questions from receiving nurse.   Visit Vitals    /88    Pulse (!) 103    Temp 97.2 °F (36.2 °C)    Resp 16    Ht 5' 6\" (1.676 m)    Wt 102.5 kg (226 lb)    LMP 10/11/2016    SpO2 96%    BMI 36.48 kg/m2

## 2017-11-27 NOTE — PROGRESS NOTES
193 Pt presents to the unit as a G1 37.2 weeks with complaints of contractions every 6 minutes apart and small amounts of bleeding. Pt denies any leakage of fluid and states that the baby is moving well. Pt states that her and her significant other had sexual intercourse earlier today and denies any placental issues.  Dr. Cynthia Zuniga notified about pt. Cervical check to be preformed     Cervical check preformed. Small amount of blood noted on glove     pt on left side     pt on right side     IV started. LR bolus started per orders     Dr. Cynthia Zuniga at bedside with pt discussing discharge plan     ultrasound readjusted. Mother is told to take deep breaths. Dr Cynthia Zuniga is notified about deceleration    0731 40 44 23 Pt to be admitted for labor. Pt is transferred to labor room    2357 AROM, IUPC, and FSE preformed by Dr. Cynthia Zuniga at this time. Dr. Cynthia Zuniga discussed a possible  with pt    0010 Decision for  at this time    0013 Consents being signed, Abdomen prepped, pt shaved    0019 Pt to OR    0214 PT returns from PACU and is back in room on L&D unit    0405 PT taken to mother baby unit    0410 SBAR given to BHARTI Spears RN at this time

## 2017-11-27 NOTE — ROUTINE PROCESS
Bedside and Verbal shift change report given to Frankey Starring (oncoming nurse) by BHARTI Diane (offgoing nurse). Report included the following information SBAR, Intake/Output and Recent Results.

## 2017-11-27 NOTE — PROGRESS NOTES
Pt. Began ricki and had repeated late and depp decels. Cx was 3 cms, AROM showed moderate meconium. Taken to OR for stat C/S.

## 2017-11-27 NOTE — ANESTHESIA POSTPROCEDURE EVALUATION
Post-Anesthesia Evaluation and Assessment    Cardiovascular Function/Vital Signs  Visit Vitals    /70    Pulse 73    Temp 36.8 °C (98.2 °F)    Resp 17    Ht 5' 6\" (1.676 m)    Wt 102.5 kg (226 lb)    SpO2 100%    BMI 36.48 kg/m2       Patient is status post Procedure(s):   SECTION. Nausea/Vomiting: Controlled. Postoperative hydration reviewed and adequate. Pain:  Pain Scale 1: FLACC (17)  Pain Intensity 1: 2 (17)   Managed. Neurological Status:   Neuro (WDL): Exceptions to WDL (17)   At baseline. Mental Status and Level of Consciousness: Arousable. Pulmonary Status:   O2 Device: Non-rebreather mask (17)   Adequate oxygenation and airway patent. Complications related to anesthesia: None    Post-anesthesia assessment completed. No concerns. Patient has met all discharge requirements.     Signed By: Jan Whitfield CRNA    2017

## 2017-11-27 NOTE — PROGRESS NOTES
Chart reviewed. Noted CM consult for positive drug screen. Met with mother at bedside. Explained reason for consult. Mother admitted to marijuana use, last use 3 weeks ago. States she began using to alleviate nausea. Reports she was admitted to hospital for nausea. States when discharged she will return home where she lives with grandmother and grandfather. Reports baby's father is involved. Reports she has all baby supplies, including car seat. Pt states she has [address] appointment on 12/5/17. Pt aware CPS to be notified. [facility] hotline called at [phone], message left. Waiting on return call at this time.

## 2017-11-27 NOTE — H&P
History & Physical    Name: Lm Stein MRN: [id number]  SSN: [id number]    YOB: 1990  Age: 33 y.o. Sex: female        Subjective:     Estimated Date of Delivery: 12/15/17  OB History      Para Term  AB Living    1         SAB TAB Ectopic Molar Multiple Live Births                   Ms. Trupti Mathias   is admitted with pregnancy at 37 weeks 2 days for spotting post intercourse. .   Prenatal course was complicated by bleeding at 25 weeks, was admitted a [facility]. Cydney Kennedy Please see prenatal records for details. PMHx, SHx, Family Hx, ROS unchanged from prenatal H&P. Group B Strep was negative. Objective:     Vitals:  Vitals:    17 1934 17 194   Resp:  18   Temp:  98.2 °F (36.8 °C)   Weight: 102.5 kg (226 lb)    Height: 5' 6\" (1.676 m)         Physical Exam:  Cervical Exam  Dilation (cm): 1.5  Eff: 90 %  Station: -1  Cervical Consistency: Soft  Vaginal exam done by? : Lee Nobles RN  Membrane Status: Intact  Temperature 98.2 °F (36.8 °C), resp. rate 18, height 5' 6\" (1.676 m), weight 102.5 kg (226 lb), last menstrual period 10/11/2016. Temp (24hrs), Av.2 °F (36.8 °C), Min:98.2 °F (36.8 °C), Max:98.2 °F (36.8 °C)              Data Review:   No results found for this or any previous visit (from the past 24 hour(s)). Monitor:  Reactivity:present but some variable decels. Variability:present. Baseline:within normal limits        Assessment/Plan:     Plan:  Admit for observation, may warrant induction if variables persist.  Risks/Benefits explained and Consent Signed.           Signed By:  Mk Fernández MD     2017

## 2017-11-27 NOTE — PROGRESS NOTES
11/26/2017 1920- Patient arrived to L&D via wheelchair. Patient states that she has had some bleeding. Denies wearing pad at moment.

## 2017-11-27 NOTE — LACTATION NOTE
Set up with breast pump and instructed on use as patient needs to pump and dump for 90 days from last positive drug screen due to history of THC use. Infant having difficulty feeding from bottle, offered assistance and with NUK nipple and chin support, infant able to take 15 ml with minimal spit up during burping.

## 2017-11-27 NOTE — PERIOP NOTES
Patients PCA programmed, connected and infusing. All clamps open. Instructed and educated patient on how to use PCA for pain control. Button given to patient. Will continue to monitor.

## 2017-11-27 NOTE — ANESTHESIA PREPROCEDURE EVALUATION
Anesthetic History               Review of Systems / Medical History  Patient summary reviewed, nursing notes reviewed and pertinent labs reviewed    Pulmonary          Smoker (did not smoke today)         Neuro/Psych           Pertinent negatives: No seizures   Cardiovascular                Pertinent negatives: No hypertension  Exercise tolerance: >4 METS     GI/Hepatic/Renal                Endo/Other          Pertinent negatives: No diabetes   Other Findings            Physical Exam    Airway  Mallampati: III  TM Distance: 4 - 6 cm  Neck ROM: normal range of motion   Mouth opening: Normal     Cardiovascular    Rhythm: regular  Rate: normal         Dental  No notable dental hx       Pulmonary  Breath sounds clear to auscultation               Abdominal  GI exam deferred       Other Findings            Anesthetic Plan    ASA: 2, emergent  Anesthesia type: general          Induction: Intravenous  Anesthetic plan and risks discussed with: Patient      ASA 2E for STAT C/S due to non-reassuring FHTs. Pt understands risks and agrees to proceed. No time for regional anesthesia.

## 2017-11-27 NOTE — ROUTINE PROCESS
Bedside and Verbal shift change report given to BHARTI Spears RN  by Iris Moran RN . Report given with Elena SOSA and MAR.

## 2017-11-28 LAB
HCT VFR BLD AUTO: 25.9 % (ref 35–45)
HGB BLD-MCNC: 8.7 G/DL (ref 12–16)

## 2017-11-28 PROCEDURE — 74011250636 HC RX REV CODE- 250/636: Performed by: ADVANCED PRACTICE MIDWIFE

## 2017-11-28 PROCEDURE — 85018 HEMOGLOBIN: CPT | Performed by: OBSTETRICS & GYNECOLOGY

## 2017-11-28 PROCEDURE — 74011250637 HC RX REV CODE- 250/637: Performed by: OBSTETRICS & GYNECOLOGY

## 2017-11-28 PROCEDURE — 74011250636 HC RX REV CODE- 250/636: Performed by: OBSTETRICS & GYNECOLOGY

## 2017-11-28 PROCEDURE — 85014 HEMATOCRIT: CPT | Performed by: OBSTETRICS & GYNECOLOGY

## 2017-11-28 PROCEDURE — 65270000029 HC RM PRIVATE

## 2017-11-28 PROCEDURE — 74011250636 HC RX REV CODE- 250/636: Performed by: ANESTHESIOLOGY

## 2017-11-28 PROCEDURE — 36415 COLL VENOUS BLD VENIPUNCTURE: CPT | Performed by: OBSTETRICS & GYNECOLOGY

## 2017-11-28 RX ADMIN — SODIUM CHLORIDE, SODIUM LACTATE, POTASSIUM CHLORIDE, AND CALCIUM CHLORIDE 125 ML: 600; 310; 30; 20 INJECTION, SOLUTION INTRAVENOUS at 08:20

## 2017-11-28 RX ADMIN — OXYCODONE HYDROCHLORIDE AND ACETAMINOPHEN 2 TABLET: 5; 325 TABLET ORAL at 18:58

## 2017-11-28 RX ADMIN — IBUPROFEN 800 MG: 400 TABLET, FILM COATED ORAL at 21:47

## 2017-11-28 RX ADMIN — OXYCODONE HYDROCHLORIDE AND ACETAMINOPHEN 2 TABLET: 5; 325 TABLET ORAL at 23:13

## 2017-11-28 RX ADMIN — IBUPROFEN 800 MG: 400 TABLET, FILM COATED ORAL at 06:00

## 2017-11-28 RX ADMIN — BENZOCAINE AND MENTHOL 1 LOZENGE: 15; 3.6 LOZENGE ORAL at 18:58

## 2017-11-28 RX ADMIN — IBUPROFEN 800 MG: 400 TABLET, FILM COATED ORAL at 13:45

## 2017-11-28 RX ADMIN — KETOROLAC TROMETHAMINE 30 MG: 30 INJECTION, SOLUTION INTRAMUSCULAR at 00:15

## 2017-11-28 RX ADMIN — OXYCODONE HYDROCHLORIDE AND ACETAMINOPHEN 2 TABLET: 5; 325 TABLET ORAL at 14:58

## 2017-11-28 RX ADMIN — MEPERIDINE HYDROCHLORIDE 50 MG: 50 INJECTION, SOLUTION INTRAMUSCULAR; INTRAVENOUS; SUBCUTANEOUS at 14:30

## 2017-11-28 RX ADMIN — OXYCODONE HYDROCHLORIDE AND ACETAMINOPHEN 2 TABLET: 5; 325 TABLET ORAL at 10:57

## 2017-11-28 NOTE — ROUTINE PROCESS
Bedside and Verbal shift change report given to Stanford Hilario RN  by Luisana Perera RN . Report given with Elena SOSA and MAR.

## 2017-11-28 NOTE — PROGRESS NOTES
Post-Operative  Day 1    Rosy   [id number]      Information for the patient's :  Shaji Artist [[id number]]   , Low Transverse   Patient doing well without significant complaint. Tolerating diet, yes flatus, traore removed. Patient is breastfeeding. Lochia reportedly normal.    Vitals:  Visit Vitals    BP 92/61 (BP 1 Location: Right arm, BP Patient Position: At rest)    Pulse 67    Temp 98 °F (36.7 °C)    Resp 18    Ht 5' 6\" (1.676 m)    Wt 102.5 kg (226 lb)    LMP 10/11/2016    SpO2 96%    BMI 36.48 kg/m2     Temp (24hrs), Av.3 °F (36.8 °C), Min:98 °F (36.7 °C), Max:98.7 °F (37.1 °C)      Last 24hr Input/Output:    Intake/Output Summary (Last 24 hours) at 17 0909  Last data filed at 17 2123   Gross per 24 hour   Intake                0 ml   Output             1275 ml   Net            -1275 ml        Exam:    Patient without distress. Lungs clear. Abdomen, bowel sounds present, soft, expected tenderness, fundus firm 0   Wound dressing intact. Labs:   Recent Results (from the past 24 hour(s))   HEMOGLOBIN    Collection Time: 17  3:18 AM   Result Value Ref Range    HGB 8.7 (L) 12.0 - 16.0 g/dL   HEMATOCRIT    Collection Time: 17  3:18 AM   Result Value Ref Range    HCT 25.9 (L) 35.0 - 45.0 %     Assessment: Post-Op day 1, stable. Plan:   1. Routine post-operative care as per  day one orders.         Vipul Kent CNM  2017  9:09 AM

## 2017-11-28 NOTE — PROGRESS NOTES
Ama Troy, RN Care Management Signed NURSING Progress Notes Date of Service: 11/28/17 0924         []Hide copied text  []Yoanna for attribution information  Laura from [place] [facility] will be in today on unit to visit mom cm informed mother and bedside nurse Saud Roche.

## 2017-11-28 NOTE — PROGRESS NOTES
Patient states percocet was not effective for pain. Had motrin at 0600. Will wait 30 minutes to see if med will start working. Shawn Jacobsen paged to notify patient has had no pain relief from motrin and percocet. Pain is 10/10  1250 - LEO Jacobsen notified of above. Will consult with Dr Cynthia Zuniga and call me back. 1345 - Motrin 800 mg given 15 minutes before due time as patient continues to complain of pain. 1400 - LEO Jacobsen in and orders for demerol 50 mg I/M placed. 1430 - Demerol 50 mg given I/M in right glut. 1500- Patient states pain is down to 8/10. Percocet 2 tablets given.

## 2017-11-28 NOTE — ROUTINE PROCESS
Bedside and Verbal shift change report given to Arlene Dominguez (oncoming nurse) by BHARTI Marie (offgoing nurse). Report included the following information SBAR, Intake/Output and Recent Results.

## 2017-11-29 PROCEDURE — 65270000029 HC RM PRIVATE

## 2017-11-29 PROCEDURE — 74011250637 HC RX REV CODE- 250/637: Performed by: OBSTETRICS & GYNECOLOGY

## 2017-11-29 RX ORDER — OXYCODONE AND ACETAMINOPHEN 5; 325 MG/1; MG/1
1-2 TABLET ORAL
Status: DISCONTINUED | OUTPATIENT
Start: 2017-11-29 | End: 2017-11-30 | Stop reason: HOSPADM

## 2017-11-29 RX ORDER — ACETAMINOPHEN 325 MG/1
650 TABLET ORAL
Status: DISCONTINUED | OUTPATIENT
Start: 2017-11-29 | End: 2017-11-30 | Stop reason: HOSPADM

## 2017-11-29 RX ORDER — IBUPROFEN 400 MG/1
800 TABLET ORAL
Status: DISCONTINUED | OUTPATIENT
Start: 2017-12-02 | End: 2017-11-29

## 2017-11-29 RX ORDER — IBUPROFEN 800 MG/1
800 TABLET ORAL
Qty: 60 TAB | Refills: 1 | Status: SHIPPED | OUTPATIENT
Start: 2017-11-29

## 2017-11-29 RX ORDER — FACIAL-BODY WIPES
10 EACH TOPICAL
Status: DISCONTINUED | OUTPATIENT
Start: 2017-11-29 | End: 2017-11-30 | Stop reason: HOSPADM

## 2017-11-29 RX ORDER — PROMETHAZINE HYDROCHLORIDE 25 MG/ML
25 INJECTION, SOLUTION INTRAMUSCULAR; INTRAVENOUS
Status: DISCONTINUED | OUTPATIENT
Start: 2017-11-29 | End: 2017-11-30 | Stop reason: HOSPADM

## 2017-11-29 RX ORDER — IBUPROFEN 400 MG/1
800 TABLET ORAL
Status: DISCONTINUED | OUTPATIENT
Start: 2017-11-29 | End: 2017-11-30 | Stop reason: HOSPADM

## 2017-11-29 RX ORDER — SIMETHICONE 80 MG
80 TABLET,CHEWABLE ORAL
Status: DISCONTINUED | OUTPATIENT
Start: 2017-11-29 | End: 2017-11-30 | Stop reason: HOSPADM

## 2017-11-29 RX ORDER — OXYCODONE AND ACETAMINOPHEN 5; 325 MG/1; MG/1
2 TABLET ORAL
Qty: 30 TAB | Refills: 0 | Status: SHIPPED | OUTPATIENT
Start: 2017-11-29

## 2017-11-29 RX ORDER — ZOLPIDEM TARTRATE 5 MG/1
5 TABLET ORAL
Status: DISCONTINUED | OUTPATIENT
Start: 2017-11-29 | End: 2017-11-30 | Stop reason: HOSPADM

## 2017-11-29 RX ADMIN — IBUPROFEN 800 MG: 400 TABLET, FILM COATED ORAL at 14:26

## 2017-11-29 RX ADMIN — OXYCODONE HYDROCHLORIDE AND ACETAMINOPHEN 2 TABLET: 5; 325 TABLET ORAL at 03:17

## 2017-11-29 RX ADMIN — OXYCODONE HYDROCHLORIDE AND ACETAMINOPHEN 2 TABLET: 5; 325 TABLET ORAL at 07:27

## 2017-11-29 RX ADMIN — OXYCODONE HYDROCHLORIDE AND ACETAMINOPHEN 2 TABLET: 5; 325 TABLET ORAL at 12:13

## 2017-11-29 RX ADMIN — IBUPROFEN 800 MG: 400 TABLET, FILM COATED ORAL at 22:02

## 2017-11-29 RX ADMIN — IBUPROFEN 800 MG: 400 TABLET, FILM COATED ORAL at 06:19

## 2017-11-29 RX ADMIN — BENZOCAINE AND MENTHOL 1 LOZENGE: 15; 3.6 LOZENGE ORAL at 07:27

## 2017-11-29 RX ADMIN — OXYCODONE HYDROCHLORIDE AND ACETAMINOPHEN 2 TABLET: 5; 325 TABLET ORAL at 21:22

## 2017-11-29 NOTE — DISCHARGE INSTRUCTIONS
POST DELIVERY DISCHARGE INSTRUCTIONS    Name: Art Hickey  YOB: 1990  Primary Diagnosis: Active Problems:    IUP (intrauterine pregnancy), incidental (2017)        General:     Diet/Diet Restrictions:  Eight 8-ounce glasses of fluid daily (water, juices); avoid excessive caffeine intake. Meals/snacks as desired which are high in fiber and carbohydrates and low in fat and cholesterol. Physical Activity / Restrictions / Safety:     Avoid heavy lifting, no more that 8 lbs. For 2-3 weeks; limit use of stairs to 2 times daily for the first week home. No driving for one week. Avoid intercourse 4-6 weeks, no douching or tampon use. Check with obstetrician before starting or resuming an exercise program.         Discharge Instructions/Special Treatment/Home Care Needs:     Continue prenatal vitamins. Continue to use squirt bottle with warm water on your episiotomy after each bathroom use until bleeding stops. If steri-strips applied to your incision, remove in 7-10 days. Call your doctor for the following:     Fever over 101 degrees by mouth. Vaginal bleeding heavier than a normal menstrual period or clot larger than a golf ball. Red streaks or increased swelling of legs, painful red streaks on your breast.  Painful urination, constipation and increased pain or swelling or discharge with your incision. If you feel extremely anxious or overwhelmed. If you have thoughts of harming yourself and/or your baby. Pain Management:     Pain Management:   Take Acetaminophen (Tylenol) or Ibuprofen (Advil, Motrin), as directed for pain. Use a warm Sitz bath 3 times daily to relieve episiotomy or hemorrhoidal discomfort. Heating pad to  incision as needed. For hemorrhoidal discomfort, use Tucks and Anusol cream as needed and directed. Follow-Up Care:      These are general instructions for a healthy lifestyle:    No smoking/ No tobacco products/ Avoid exposure to second hand smoke    Surgeon General's Warning:  Quitting smoking now greatly reduces serious risk to your health. Obesity, smoking, and sedentary lifestyle greatly increases your risk for illness    A healthy diet, regular physical exercise & weight monitoring are important for maintaining a healthy lifestyle    Recognize signs and symptoms of STROKE:    F-face looks uneven    A-arms unable to move or move unevenly    S-speech slurred or non-existent    T-time-call 911 as soon as signs and symptoms begin-DO NOT go       Back to bed or wait to see if you get better-TIME IS BRAIN. Signed By: Carmelo Doty                                                                                                   Date: 11/29/2017 Time: 1:36 PM    Patient armband removed and given to patient to take home.   Patient was informed of the privacy risks if armband lost or stolen

## 2017-11-29 NOTE — DISCHARGE SUMMARY
Obstetrical Discharge Summary     Name: Rosy Stewart MRN: [id number]  SSN: [id number]    YOB: 1990  Age: 33 y.o. Sex: female      Admit Date: 2017    Discharge Date: 2017     Admitting Physician: Noris Colin MD     Attending Physician:  Noris Colin MD     Admission Diagnoses: LABOR  IUP (intrauterine pregnancy), incidental    Discharge Diagnoses:   Information for the patient's :  Shaji Bradleyist [[id number]]   Delivery of a 2.182 kg male infant via , Low Transverse on 2017 at 12:36 AM  by . Apgars were 8 and 9. Additional Diagnoses:   Hospital Problems  Date Reviewed: 2017          Codes Class Noted POA    IUP (intrauterine pregnancy), incidental ICD-10-CM: Z34.90  ICD-9-CM: V22.2  2017 Unknown             Lab Results   Component Value Date/Time    Rubella, External Immune 06/15/2017       Immunization(s): There is no immunization history for the selected administration types on file for this patient. Hospital Course: Normal hospital course following the delivery. Patient Instructions:   Current Discharge Medication List      START taking these medications    Details   ibuprofen (MOTRIN) 800 mg tablet Take 1 Tab by mouth every eight (8) hours as needed. Indications: Pain  Qty: 60 Tab, Refills: 1      oxyCODONE-acetaminophen (PERCOCET) 5-325 mg per tablet Take 2 Tabs by mouth every four (4) hours as needed. Max Daily Amount: 12 Tabs. Qty: 30 Tab, Refills: 0             Reference my discharge instructions.     Follow-up Appointments   Procedures    FOLLOW UP VISIT Appointment in: 6 Weeks     Standing Status:   Standing     Number of Occurrences:   1     Order Specific Question:   Appointment in     Answer:   6 Weeks        Signed By:  Vipul Kent CNM     2017

## 2017-11-29 NOTE — PROGRESS NOTES
0930 Informed by midwife Sanjiv Chen CNM) that discharge plans discussed with patient. Patient confirmed midwife's statement. No concerns expressed to nurse about decision to discharge today (11/29/17). Discharge order placed by LEO Gunn. Rooming-In policy discussed with mom. Rooming-in information sheet reviewed and given to mom. No concerns or needs reported at this time. Patient discharged per protocol. Patient in stable condition. Patient reports controlled pain levels at this time. Discharged education reviewed and packet given to patient. E-sign completed. Armbands removed and given to patient. No further needs reported at this time.

## 2017-11-29 NOTE — PROGRESS NOTES
Bedside shift change report given to Bruno Rogel RN (oncoming nurse) by Devin Higginbotham RN (offgoing nurse). Report included the following information SBAR, Kardex, MAR and Recent Results.

## 2017-11-29 NOTE — PROGRESS NOTES
Post-Operative  Day 2    Jaimie Sauer   [id number]      Patient doing well without significant complaint. Tolerating diet, passing flatus, voiding and ambulating without difficulty    Vitals:  Visit Vitals    /86 (BP Patient Position: At rest)    Pulse 92    Temp 98.2 °F (36.8 °C)    Resp 20    Ht 5' 6\" (1.676 m)    Wt 102.5 kg (226 lb)    LMP 10/11/2016    SpO2 100%    BMI 36.48 kg/m2     Temp (24hrs), Av.5 °F (36.9 °C), Min:98.2 °F (36.8 °C), Max:98.9 °F (37.2 °C)        Exam:    Patient without distress. Abdomen: bowel sounds present, soft, expected tenderness, fundus firm -1. Wound incision clean, dry and intact. Labs:   Lab Results   Component Value Date/Time    WBC 11.4 2017 11:55 PM    WBC 9.1 2017 09:40 PM    WBC 8.9 2017 09:15 AM    WBC 6.7 10/12/2016 07:14 PM    HGB 8.7 2017 03:18 AM    HGB 11.9 2017 11:55 PM    HGB 11.1 2017 09:40 PM    HGB 11.4 2017 09:15 AM    HGB 14.1 10/12/2016 07:14 PM    HCT 25.9 2017 03:18 AM    HCT 35.0 2017 11:55 PM    HCT 32.5 2017 09:40 PM    HCT 32.9 2017 09:15 AM    HCT 42.3 10/12/2016 07:14 PM    PLATELET 703 [id number] 11:55 PM    PLATELET 011  09:40 PM    PLATELET 214  09:15 AM    PLATELET 596  07:14 PM       No results found for this or any previous visit (from the past 24 hour(s)). Assessment: Post-Op day 2, doing well    Plan:   1. Discharge home today  2. Follow-up in office in 2 and 6 weeks with Zulma Schafer MD  3. Post partum activity/wound care advised, diet as tolerated  4. Discharge Medications: ibuprofen, percocet and medications prior to admission    Discharge Instructions: There are no discharge medications for this patient.       Sonali West CNM   2017  9:34 AM

## 2017-11-29 NOTE — PROGRESS NOTES
Problem: Falls - Risk of  Goal: *Absence of Falls  Document Nelida Fall Risk and appropriate interventions in the flowsheet.    Outcome: Progressing Towards Goal  Fall Risk Interventions:            Medication Interventions: Teach patient to arise slowly

## 2017-11-29 NOTE — PROGRESS NOTES
Received handoff report from Lona Ayon RN. Patient resting in bed quietly. Patient in stable condition. No further needs reported at this time.

## 2017-11-29 NOTE — LACTATION NOTE
Per mom, she thinks she wants to just pump and never latch infant. Discussed marijuana use in pregnancy and how it effects breastmilk--all possible risks explained. Mom states she wants to start giving her breastmilk.

## 2017-11-29 NOTE — PROGRESS NOTES
BEDSIDE_VERBAL_RECORDED_WRITTEN: shift change report given to [address] [place] [address] (oncoming nurse) by ashok Frank (offgoing nurse). Report given with Elena SOSA and MAR.

## 2017-11-30 VITALS
OXYGEN SATURATION: 100 % | WEIGHT: 226 LBS | DIASTOLIC BLOOD PRESSURE: 63 MMHG | HEART RATE: 83 BPM | RESPIRATION RATE: 19 BRPM | SYSTOLIC BLOOD PRESSURE: 119 MMHG | BODY MASS INDEX: 36.32 KG/M2 | TEMPERATURE: 98.4 F | HEIGHT: 66 IN

## 2017-11-30 PROCEDURE — 74011250637 HC RX REV CODE- 250/637: Performed by: OBSTETRICS & GYNECOLOGY

## 2017-11-30 RX ADMIN — IBUPROFEN 800 MG: 400 TABLET, FILM COATED ORAL at 07:44

## 2017-11-30 RX ADMIN — OXYCODONE HYDROCHLORIDE AND ACETAMINOPHEN 2 TABLET: 5; 325 TABLET ORAL at 07:44

## 2017-11-30 RX ADMIN — OXYCODONE HYDROCHLORIDE AND ACETAMINOPHEN 2 TABLET: 5; 325 TABLET ORAL at 02:57

## 2017-11-30 RX ADMIN — OXYCODONE HYDROCHLORIDE AND ACETAMINOPHEN 2 TABLET: 5; 325 TABLET ORAL at 12:03

## 2017-11-30 NOTE — ROUTINE PROCESS
EMR entered for educational purposes in the role of [place] Instructor supervising the nursing student obtaining vital signs. EMR entered for educational purposes in the role of [place] Instructor supervising the nursing student obtaining vital signs.

## 2017-11-30 NOTE — PROGRESS NOTES
Patient upset that she has to be moved over to rooming in. Patient states that she did not want to go home but was discharged anyway. Patient states she did not know that she did not know that her baby would not be in the room with her if she moved to the other side. Patient refusing to be moved. Patient is a 2 day . Dr Ngoc Agustin called and notified that patient is a 2 day section, baby unable to go and if her discharge can be changed until tomorrow. Orders received to discharged tomorrow.

## 2017-11-30 NOTE — PROGRESS NOTES
Post-Operative  Day 3    Tonie Dawkins   [id number]      Patient doing well without significant complaint. Tolerating diet, passing flatus, voiding and ambulating without difficulty  Stayed because of baby. Vitals:  Visit Vitals    /63 (BP 1 Location: Left arm, BP Patient Position: At rest)    Pulse 83    Temp 100 °F (37.8 °C)    Resp 19    Ht 5' 6\" (1.676 m)    Wt 102.5 kg (226 lb)    LMP 10/11/2016    SpO2 100%    BMI 36.48 kg/m2     Temp (24hrs), Av.6 °F (37.6 °C), Min:99.2 °F (37.3 °C), Max:100 °F (37.8 °C)        Exam:    Patient without distress. Abdomen: bowel sounds present, soft, expected tenderness, fundus firm -3. Wound incision clean, dry and intact. Labs:   Lab Results   Component Value Date/Time    WBC 11.4 2017 11:55 PM    WBC 9.1 2017 09:40 PM    WBC 8.9 2017 09:15 AM    WBC 6.7 10/12/2016 07:14 PM    HGB 8.7 2017 03:18 AM    HGB 11.9 2017 11:55 PM    HGB 11.1 2017 09:40 PM    HGB 11.4 2017 09:15 AM    HGB 14.1 10/12/2016 07:14 PM    HCT 25.9 2017 03:18 AM    HCT 35.0 2017 11:55 PM    HCT 32.5 2017 09:40 PM    HCT 32.9 2017 09:15 AM    HCT 42.3 10/12/2016 07:14 PM    PLATELET 457  11:55 PM    PLATELET 052  09:40 PM    PLATELET 077  09:15 AM    PLATELET 020  07:14 PM       No results found for this or any previous visit (from the past 24 hour(s)). Assessment: Post-Op day 3, doing well    Plan:   1. Discharge home today  2. Follow-up in office in 2 and 6 weeks with Jia Chavira MD  3. Post partum activity/wound care advised, diet as tolerated  4. Discharge Medications: ibuprofen, percocet and medications prior to admission    Discharge Instructions:  Current Discharge Medication List      START taking these medications    Details   ibuprofen (MOTRIN) 800 mg tablet Take 1 Tab by mouth every eight (8) hours as needed.  Indications: Pain  Qty: 60 Tab, Refills: 1      oxyCODONE-acetaminophen (PERCOCET) 5-325 mg per tablet Take 2 Tabs by mouth every four (4) hours as needed. Max Daily Amount: 12 Tabs.   Qty: 30 Tab, Refills: 0             Sav Cruz MD   11/30/2017  9:39 AM

## 2017-11-30 NOTE — PROGRESS NOTES
Bedside and Verbal shift change report given to LEO Elise RN (oncoming nurse) by BHARTI Darden RN (offgoing nurse). Report included the following information SBAR, Kardex, Intake/Output, MAR and Recent Results.

## 2018-12-11 ENCOUNTER — APPOINTMENT (OUTPATIENT)
Dept: GENERAL RADIOLOGY | Age: 28
End: 2018-12-11
Attending: PHYSICIAN ASSISTANT
Payer: MEDICAID

## 2018-12-11 ENCOUNTER — HOSPITAL ENCOUNTER (EMERGENCY)
Age: 28
Discharge: HOME | End: 2018-12-11
Attending: EMERGENCY MEDICINE
Payer: MEDICAID

## 2018-12-11 VITALS
TEMPERATURE: 98.1 F | DIASTOLIC BLOOD PRESSURE: 74 MMHG | HEIGHT: 66 IN | HEART RATE: 80 BPM | BODY MASS INDEX: 34.87 KG/M2 | WEIGHT: 217 LBS | RESPIRATION RATE: 16 BRPM | OXYGEN SATURATION: 100 % | SYSTOLIC BLOOD PRESSURE: 120 MMHG

## 2018-12-11 DIAGNOSIS — J06.9 ACUTE URI: Primary | ICD-10-CM

## 2018-12-11 PROCEDURE — 99281 EMR DPT VST MAYX REQ PHY/QHP: CPT

## 2018-12-11 PROCEDURE — 71046 X-RAY EXAM CHEST 2 VIEWS: CPT

## 2018-12-11 RX ORDER — BENZONATATE 100 MG/1
100 CAPSULE ORAL
Qty: 30 CAP | Refills: 0 | Status: SHIPPED | OUTPATIENT
Start: 2018-12-11 | End: 2018-12-18

## 2018-12-12 NOTE — ED PROVIDER NOTES
EMERGENCY DEPARTMENT HISTORY AND PHYSICAL EXAM    Date: 12/11/2018  Patient Name: Micheal Bran    History of Presenting Illness     Chief Complaint   Patient presents with    Toxic Inhalation       History Provided By: Patient    Chief Complaint: Black mold exposure  Duration: Earlier today  Timing:  Acute  Location: Chest  Quality: Pressure  Severity: Mild  Associated Symptoms: SOB, dry cough, congestion, and neck pain    Additional History (Context):   8:42 PM  Micheal Bran is a 33 y.o. female with no significant PMHX who presents to the emergency department C/O black onset exposure onset earlier today. Associated sxs include SOB, dry cough, congestion, and neck pain. Patient notes that she has had cold-like symptoms for the last 1.5 weeks but is here today for exposure to black mold. Reports that there was only a short exposure to black mold and that it has been removed from the environment. Also notes that her child was diagnosed with a URI or potential bronchitis. Pt denies fever, and any other sxs or complaints. PCP: None    Current Outpatient Medications   Medication Sig Dispense Refill    benzonatate (TESSALON PERLES) 100 mg capsule Take 1 Cap by mouth three (3) times daily as needed for Cough for up to 7 days. 30 Cap 0    ibuprofen (MOTRIN) 800 mg tablet Take 1 Tab by mouth every eight (8) hours as needed. Indications: Pain 60 Tab 1    oxyCODONE-acetaminophen (PERCOCET) 5-325 mg per tablet Take 2 Tabs by mouth every four (4) hours as needed. Max Daily Amount: 12 Tabs. 30 Tab 0       Past History     Past Medical History:  History reviewed. No pertinent past medical history. Past Surgical History:  History reviewed. No pertinent surgical history. Family History:  History reviewed. No pertinent family history.     Social History:  Social History     Tobacco Use    Smoking status: Current Some Day Smoker     Packs/day: 0.25    Smokeless tobacco: Former User   Substance Use Topics    Alcohol use: Yes     Comment: socially    Drug use: Yes     Frequency: 1.0 times per week     Types: Marijuana       Allergies: Allergies   Allergen Reactions    Fruit C [Ascorbic Acid] Angioedema         Review of Systems   Review of Systems   Constitutional: Negative for fever. HENT: Positive for congestion. Respiratory: Positive for cough (dry) and shortness of breath. Musculoskeletal: Positive for neck pain. All other systems reviewed and are negative. Physical Exam     Vitals:    12/11/18 2000   BP: 120/74   Pulse: 80   Resp: 16   Temp: 98.1 °F (36.7 °C)   SpO2: 100%   Weight: 98.4 kg (217 lb)   Height: 5' 6\" (1.676 m)     Physical Exam   Constitutional: She is oriented to person, place, and time. She appears well-developed and well-nourished. No distress. HENT:   Head: Normocephalic and atraumatic. Eyes: Conjunctivae and EOM are normal. Pupils are equal, round, and reactive to light. Neck: Normal range of motion. Neck supple. Cardiovascular: Normal rate. Pulmonary/Chest: Effort normal and breath sounds normal. She has no wheezes. Musculoskeletal: Normal range of motion. Neurological: She is alert and oriented to person, place, and time. Skin: Skin is warm and dry. Psychiatric: She has a normal mood and affect. Her behavior is normal.   Nursing note and vitals reviewed. Diagnostic Study Results     Labs -   No results found for this or any previous visit (from the past 12 hour(s)). Radiologic Studies -     RADIOLOGY FINDINGS  Chest X-ray shows NAP  Pending review by Radiologist  Recorded by Rajendra Nova ED Scribe, as dictated by Flaquita Bravo PA-C    XR CHEST PA LAT    (Results Pending)     CT Results  (Last 48 hours)    None        CXR Results  (Last 48 hours)    None          Medications given in the ED-  Medications - No data to display      Medical Decision Making   I am the first provider for this patient.     I reviewed the vital signs, available nursing notes, past medical history, past surgical history, family history and social history. Vital Signs-Reviewed the patient's vital signs. Pulse Oximetry Analysis - 100% on RA     Records Reviewed: Nursing Notes    Procedures:  Procedures    ED Course:   8:42 PM Initial assessment performed. The patients presenting problems have been discussed, and they are in agreement with the care plan formulated and outlined with them. I have encouraged them to ask questions as they arise throughout their visit. Diagnosis and Disposition       DISCHARGE NOTE:  8:54 PM  Nichelle Bumpers's  results have been reviewed with her. She has been counseled regarding her diagnosis, treatment, and plan. She verbally conveys understanding and agreement of the signs, symptoms, diagnosis, treatment and prognosis and additionally agrees to follow up as discussed. She also agrees with the care-plan and conveys that all of her questions have been answered. I have also provided discharge instructions for her that include: educational information regarding their diagnosis and treatment, and list of reasons why they would want to return to the ED prior to their follow-up appointment, should her condition change. She has been provided with education for proper emergency department utilization. CLINICAL IMPRESSION:    1. Acute URI        PLAN:  1. D/C Home  2. Discharge Medication List as of 12/11/2018  8:54 PM      START taking these medications    Details   benzonatate (TESSALON PERLES) 100 mg capsule Take 1 Cap by mouth three (3) times daily as needed for Cough for up to 7 days. , Normal, Disp-30 Cap, R-0         CONTINUE these medications which have NOT CHANGED    Details   ibuprofen (MOTRIN) 800 mg tablet Take 1 Tab by mouth every eight (8) hours as needed. Indications: Pain, Normal, Disp-60 Tab, R-1      oxyCODONE-acetaminophen (PERCOCET) 5-325 mg per tablet Take 2 Tabs by mouth every four (4) hours as needed.  Max Daily Amount: 12 Tabs., Print, Disp-30 Tab, R-0           3. Follow-up Information     Follow up With Specialties Details Why Contact Info    Elaine Otto MD Internal Medicine Schedule an appointment as soon as possible for a visit in 2 days For PCP follow up     [facility] EMERGENCY DE[facility] Emergency Medicine Go to As needed, If symptoms worsen [address]  [address] [address]  [phone]        _______________________________    Attestations: This note is prepared by Stephanie Gu, acting as Scribe for Bc Mason PA-C. Bc Mason PA-C:  The scribe's documentation has been prepared under my direction and personally reviewed by me in its entirety.   I confirm that the note above accurately reflects all work, treatment, procedures, and medical decision making performed by me.  _______________________________

## 2018-12-12 NOTE — DISCHARGE INSTRUCTIONS
